# Patient Record
Sex: FEMALE | Race: WHITE | Employment: OTHER | URBAN - METROPOLITAN AREA
[De-identification: names, ages, dates, MRNs, and addresses within clinical notes are randomized per-mention and may not be internally consistent; named-entity substitution may affect disease eponyms.]

---

## 2017-04-12 RX ORDER — DILTIAZEM HYDROCHLORIDE 240 MG/1
240 CAPSULE, COATED, EXTENDED RELEASE ORAL DAILY
Qty: 30 CAPSULE | Refills: 5 | Status: SHIPPED | OUTPATIENT
Start: 2017-04-12 | End: 2017-09-13 | Stop reason: SDUPTHER

## 2017-06-26 ENCOUNTER — TELEPHONE (OUTPATIENT)
Dept: CARDIOLOGY CLINIC | Age: 82
End: 2017-06-26

## 2017-09-13 ENCOUNTER — OFFICE VISIT (OUTPATIENT)
Dept: CARDIOLOGY CLINIC | Age: 82
End: 2017-09-13

## 2017-09-13 VITALS
OXYGEN SATURATION: 96 % | DIASTOLIC BLOOD PRESSURE: 60 MMHG | HEART RATE: 74 BPM | SYSTOLIC BLOOD PRESSURE: 136 MMHG | BODY MASS INDEX: 33.13 KG/M2 | HEIGHT: 63 IN | WEIGHT: 187 LBS

## 2017-09-13 DIAGNOSIS — E78.2 MIXED HYPERLIPIDEMIA: ICD-10-CM

## 2017-09-13 DIAGNOSIS — I48.0 PAROXYSMAL ATRIAL FIBRILLATION (HCC): Primary | ICD-10-CM

## 2017-09-13 DIAGNOSIS — I10 ESSENTIAL HYPERTENSION: ICD-10-CM

## 2017-09-13 PROCEDURE — 99214 OFFICE O/P EST MOD 30 MIN: CPT | Performed by: INTERNAL MEDICINE

## 2017-09-13 RX ORDER — DILTIAZEM HYDROCHLORIDE 240 MG/1
240 CAPSULE, COATED, EXTENDED RELEASE ORAL DAILY
Qty: 30 CAPSULE | Refills: 5 | Status: SHIPPED | OUTPATIENT
Start: 2017-09-13 | End: 2018-04-10 | Stop reason: SDUPTHER

## 2017-09-13 RX ORDER — FUROSEMIDE 20 MG/1
20 TABLET ORAL DAILY PRN
Qty: 30 TABLET | Refills: 5 | Status: SHIPPED | OUTPATIENT
Start: 2017-09-13 | End: 2018-09-13 | Stop reason: SDUPTHER

## 2017-09-26 ENCOUNTER — TELEPHONE (OUTPATIENT)
Dept: CARDIOLOGY CLINIC | Age: 82
End: 2017-09-26

## 2017-10-02 ENCOUNTER — TELEPHONE (OUTPATIENT)
Dept: CARDIOLOGY CLINIC | Age: 82
End: 2017-10-02

## 2017-10-02 NOTE — TELEPHONE ENCOUNTER
----- Message from Jerry Smalls MD sent at 10/2/2017  8:11 AM EDT -----  Cholesterol labs are good overall except  and would like < 100. Please keep taking pravachol every day and watch diet as closely as possible.

## 2017-10-30 ENCOUNTER — TELEPHONE (OUTPATIENT)
Dept: CARDIOLOGY CLINIC | Age: 82
End: 2017-10-30

## 2018-04-10 RX ORDER — DILTIAZEM HYDROCHLORIDE 240 MG/1
240 CAPSULE, COATED, EXTENDED RELEASE ORAL DAILY
Qty: 30 CAPSULE | Refills: 5 | Status: SHIPPED | OUTPATIENT
Start: 2018-04-10 | End: 2018-09-13 | Stop reason: SDUPTHER

## 2018-09-13 ENCOUNTER — OFFICE VISIT (OUTPATIENT)
Dept: CARDIOLOGY CLINIC | Age: 83
End: 2018-09-13

## 2018-09-13 VITALS
DIASTOLIC BLOOD PRESSURE: 62 MMHG | HEART RATE: 65 BPM | HEIGHT: 63 IN | OXYGEN SATURATION: 95 % | SYSTOLIC BLOOD PRESSURE: 148 MMHG | BODY MASS INDEX: 32.07 KG/M2 | WEIGHT: 181 LBS

## 2018-09-13 DIAGNOSIS — I48.0 PAROXYSMAL ATRIAL FIBRILLATION (HCC): Primary | ICD-10-CM

## 2018-09-13 DIAGNOSIS — I10 ESSENTIAL HYPERTENSION: ICD-10-CM

## 2018-09-13 DIAGNOSIS — E78.2 MIXED HYPERLIPIDEMIA: ICD-10-CM

## 2018-09-13 PROCEDURE — 99214 OFFICE O/P EST MOD 30 MIN: CPT | Performed by: INTERNAL MEDICINE

## 2018-09-13 RX ORDER — FUROSEMIDE 20 MG/1
20 TABLET ORAL DAILY PRN
Qty: 30 TABLET | Refills: 11 | Status: SHIPPED | OUTPATIENT
Start: 2018-09-13 | End: 2019-09-24 | Stop reason: SDUPTHER

## 2018-09-13 RX ORDER — DILTIAZEM HYDROCHLORIDE 240 MG/1
240 CAPSULE, COATED, EXTENDED RELEASE ORAL DAILY
Qty: 30 CAPSULE | Refills: 11 | Status: SHIPPED | OUTPATIENT
Start: 2018-09-13 | End: 2019-09-10 | Stop reason: SDUPTHER

## 2018-09-13 NOTE — PROGRESS NOTES
[address]   Cardiac Followup    Referring Provider:  Edward Lorenzo MD     Chief Complaint   Patient presents with    1 Year Follow Up     no cardiac complaints      Subjective: Ms Rosalba Ramirez presents to [facility] for cardiology follow up of HTN, HLD, PAF;  No complaints today     History of Present Illness:     Ms. Rosalba Ramirez is an 79yo female with history of HTN, HLD, PAF, diet controlled  diabetes, and thyroid disease. Admitted on 4/21/15 with gallstone pancreatitis. She developed atrial fibrillation during her admission. She did not have any sensation of palpitations, presyncope, dizziness, CP, or SOB with atrial fibrillation. She converted to normal sinus rhythm on IV Cardizem. Note EKG 5/6/15 showed NSR, inferior wall infarct and nonspecific ST changes. Most recent ECHO 4/24/15 showed LVEF of 55%. She returned back to the ER on 2015 with recurrent acute pancreatitis. She underwent ERCP on 2015 with cholecystectomy on 2015. She had PAF during hospitalization and note that she did not feel symptoms with the arrhythmia. Today she is here for follow up. She states she has been feeling good and active in daily life. She has not been checking her blood pressure at home. She denies chest pain, shortness of breath, edema, dizziness, palpitations and syncope. Past Medical History:   has a past medical history of CKD (chronic kidney disease) stage 3, GFR 30-59 ml/min; Diabetes mellitus ([address] Hyperlipidemia; Hypertension; and Thyroid disease. Surgical History:   has a past surgical history that includes Dilation and curettage of uterus; Tubal ligation; Bladder surgery; Skin cancer excision;  section; Colonoscopy (2007); ERCP (2015); and Cholecystectomy (05/12/15). Social History:   reports that she has never smoked. She has never used smokeless tobacco. She reports that she drinks alcohol. She reports that she does not use drugs.      Family History:  family This will have your blood pressure from today on it   4. Continue using Lasix as needed for swelling - you can take it daily if need be  5. Check blood pressure at home 3-4 times a week, call the office if you are constantly over 140/90  6. Follow up with me in 1 year. Thank you for allowing me to participate in the care of this individual.    Mariajose Burns.  Minna Partida M.D., [facility] [place]

## 2018-09-13 NOTE — LETTER
blood pressure from today on it   4. Continue using Lasix as needed for swelling - you can take it daily if need be  5. Check blood pressure at home 3-4 times a week, call the office if you are constantly over 140/90  6. Follow up with me in 1 year. If you have questions, please do not hesitate to call me. I look forward to following Greg Hinkle along with you.     Sincerely,        Rafael Lennon MD

## 2019-03-19 ENCOUNTER — TELEPHONE (OUTPATIENT)
Dept: CARDIOLOGY CLINIC | Age: 84
End: 2019-03-19

## 2019-09-10 ENCOUNTER — OFFICE VISIT (OUTPATIENT)
Dept: CARDIOLOGY CLINIC | Age: 84
End: 2019-09-10
Payer: MEDICARE

## 2019-09-10 VITALS
OXYGEN SATURATION: 97 % | WEIGHT: 180 LBS | BODY MASS INDEX: 31.89 KG/M2 | SYSTOLIC BLOOD PRESSURE: 138 MMHG | DIASTOLIC BLOOD PRESSURE: 60 MMHG | HEART RATE: 65 BPM | HEIGHT: 63 IN

## 2019-09-10 DIAGNOSIS — I48.0 PAROXYSMAL ATRIAL FIBRILLATION (HCC): Primary | ICD-10-CM

## 2019-09-10 DIAGNOSIS — I10 ESSENTIAL HYPERTENSION: ICD-10-CM

## 2019-09-10 PROCEDURE — 99214 OFFICE O/P EST MOD 30 MIN: CPT | Performed by: INTERNAL MEDICINE

## 2019-09-10 RX ORDER — DILTIAZEM HYDROCHLORIDE 240 MG/1
240 CAPSULE, COATED, EXTENDED RELEASE ORAL DAILY
Qty: 90 CAPSULE | Refills: 3 | Status: SHIPPED | OUTPATIENT
Start: 2019-09-10 | End: 2020-09-28

## 2019-09-10 RX ORDER — IRBESARTAN 150 MG/1
150 TABLET ORAL NIGHTLY
Status: ON HOLD | COMMUNITY
End: 2019-12-26

## 2019-09-10 NOTE — PROGRESS NOTES
loss or vertigo. No mouth sores or sore throat. · Cardiovascular: Reviewed in HPI  · Respiratory: No cough or wheezing, no sputum production. No hematemesis. · Gastrointestinal: No abdominal pain, appetite loss, blood in stools. No change in bowel or bladder habits. · Genitourinary: No dysuria, trouble voiding, or hematuria. · Musculoskeletal:  No gait disturbance, weakness or joint complaints. · Integumentary: No rash or pruritis. · Neurological: No headache, diplopia, change in muscle strength, numbness or tingling. No change in gait, balance, coordination, mood, affect, memory, mentation, behavior. · Psychiatric: No anxiety, no depression. · Endocrine: No malaise, fatigue or temperature intolerance. No excessive thirst, fluid intake, or urination. No tremor. · Hematologic/Lymphatic: No abnormal bruising or bleeding, blood clots or swollen lymph nodes. · Allergic/Immunologic: No nasal congestion or hives. Physical Examination:    Vitals:    09/10/19 1332   BP: 138/60   Pulse: 65   SpO2: 97%        Constitutional and General Appearance: NAD   Respiratory:  · Normal excursion and expansion without use of accessory muscles  · Resp Auscultation: Normal breath sounds without dullness  Cardiovascular:  · The apical impulses not displaced  · Heart tones are crisp and normal  · Cervical veins are not engorged  · The carotid upstroke is normal in amplitude and contour without delay or bruit  · Normal S1S2, No S3, +soft AMA  · Peripheral pulses are symmetrical and full  · There is no clubbing, cyanosis of the extremities.   · Trace BLE edema and erythematous chronic skin discoloration  · Femoral Arteries: 2+ and equal  · Pedal Pulses: 2+ and equal   Abdomen:  · No masses or tenderness  · Liver/Spleen: No Abnormalities Noted  Neurological/Psychiatric:  · Alert and oriented in all spheres  · Moves all extremities well  · Exhibits normal gait balance and coordination  · No abnormalities of mood, affect, memory,

## 2019-09-24 RX ORDER — FUROSEMIDE 20 MG/1
20 TABLET ORAL DAILY PRN
Qty: 30 TABLET | Refills: 11 | Status: ON HOLD | OUTPATIENT
Start: 2019-09-24 | End: 2019-12-31 | Stop reason: HOSPADM

## 2019-12-10 ENCOUNTER — TELEPHONE (OUTPATIENT)
Dept: CARDIOLOGY CLINIC | Age: 84
End: 2019-12-10

## 2019-12-26 ENCOUNTER — HOSPITAL ENCOUNTER (INPATIENT)
Age: 84
LOS: 5 days | Discharge: NURSING, REHAB OR LONG-TERM CARE | DRG: 291 | End: 2019-12-31
Attending: EMERGENCY MEDICINE | Admitting: INTERNAL MEDICINE
Payer: MEDICARE

## 2019-12-26 ENCOUNTER — APPOINTMENT (OUTPATIENT)
Dept: GENERAL RADIOLOGY | Age: 84
DRG: 291 | End: 2019-12-26
Payer: MEDICARE

## 2019-12-26 PROBLEM — R06.00 DYSPNEA: Status: ACTIVE | Noted: 2019-12-26

## 2019-12-26 PROBLEM — I50.9 CHF (CONGESTIVE HEART FAILURE) (HCC): Status: ACTIVE | Noted: 2019-12-26

## 2019-12-26 LAB
A/G RATIO: 1.2 (ref 1.1–2.2)
ALBUMIN SERPL-MCNC: 3.8 G/DL (ref 3.4–5)
ALP BLD-CCNC: 56 U/L (ref 40–129)
ALT SERPL-CCNC: 15 U/L (ref 10–40)
ANION GAP SERPL CALCULATED.3IONS-SCNC: 13 MMOL/L (ref 3–16)
AST SERPL-CCNC: 21 U/L (ref 15–37)
BASOPHILS ABSOLUTE: 0.1 K/UL (ref 0–0.2)
BASOPHILS RELATIVE PERCENT: 1.3 %
BILIRUB SERPL-MCNC: 0.7 MG/DL (ref 0–1)
BUN BLDV-MCNC: 14 MG/DL (ref 7–20)
CALCIUM SERPL-MCNC: 9.1 MG/DL (ref 8.3–10.6)
CHLORIDE BLD-SCNC: 99 MMOL/L (ref 99–110)
CO2: 27 MMOL/L (ref 21–32)
CREAT SERPL-MCNC: 0.9 MG/DL (ref 0.6–1.2)
EKG ATRIAL RATE: 63 BPM
EKG DIAGNOSIS: NORMAL
EKG P AXIS: 52 DEGREES
EKG P-R INTERVAL: 218 MS
EKG Q-T INTERVAL: 552 MS
EKG QRS DURATION: 96 MS
EKG QTC CALCULATION (BAZETT): 564 MS
EKG R AXIS: 86 DEGREES
EKG T AXIS: 107 DEGREES
EKG VENTRICULAR RATE: 63 BPM
EOSINOPHILS ABSOLUTE: 0.1 K/UL (ref 0–0.6)
EOSINOPHILS RELATIVE PERCENT: 1.6 %
GFR AFRICAN AMERICAN: >60
GFR NON-AFRICAN AMERICAN: 59
GLOBULIN: 3.1 G/DL
GLUCOSE BLD-MCNC: 165 MG/DL (ref 70–99)
HCT VFR BLD CALC: 37.8 % (ref 36–48)
HEMOGLOBIN: 12.5 G/DL (ref 12–16)
INR BLD: 3.69 (ref 0.86–1.14)
LYMPHOCYTES ABSOLUTE: 1.4 K/UL (ref 1–5.1)
LYMPHOCYTES RELATIVE PERCENT: 23.5 %
MAGNESIUM: 1.8 MG/DL (ref 1.8–2.4)
MCH RBC QN AUTO: 26.5 PG (ref 26–34)
MCHC RBC AUTO-ENTMCNC: 33.1 G/DL (ref 31–36)
MCV RBC AUTO: 79.9 FL (ref 80–100)
MONOCYTES ABSOLUTE: 0.4 K/UL (ref 0–1.3)
MONOCYTES RELATIVE PERCENT: 7.1 %
NEUTROPHILS ABSOLUTE: 4.1 K/UL (ref 1.7–7.7)
NEUTROPHILS RELATIVE PERCENT: 66.5 %
PDW BLD-RTO: 15.9 % (ref 12.4–15.4)
PLATELET # BLD: 178 K/UL (ref 135–450)
PMV BLD AUTO: 8.2 FL (ref 5–10.5)
POTASSIUM REFLEX MAGNESIUM: 3.1 MMOL/L (ref 3.5–5.1)
PRO-BNP: 1293 PG/ML (ref 0–449)
PROCALCITONIN: 0.03 NG/ML (ref 0–0.15)
PROTHROMBIN TIME: 43.4 SEC (ref 10–13.2)
RBC # BLD: 4.73 M/UL (ref 4–5.2)
SODIUM BLD-SCNC: 139 MMOL/L (ref 136–145)
TOTAL PROTEIN: 6.9 G/DL (ref 6.4–8.2)
TROPONIN: 0.03 NG/ML
WBC # BLD: 6.1 K/UL (ref 4–11)

## 2019-12-26 PROCEDURE — 99285 EMERGENCY DEPT VISIT HI MDM: CPT

## 2019-12-26 PROCEDURE — 85610 PROTHROMBIN TIME: CPT

## 2019-12-26 PROCEDURE — 6360000002 HC RX W HCPCS: Performed by: INTERNAL MEDICINE

## 2019-12-26 PROCEDURE — 6360000002 HC RX W HCPCS: Performed by: NURSE PRACTITIONER

## 2019-12-26 PROCEDURE — 6370000000 HC RX 637 (ALT 250 FOR IP): Performed by: INTERNAL MEDICINE

## 2019-12-26 PROCEDURE — 83735 ASSAY OF MAGNESIUM: CPT

## 2019-12-26 PROCEDURE — 1200000000 HC SEMI PRIVATE

## 2019-12-26 PROCEDURE — 80053 COMPREHEN METABOLIC PANEL: CPT

## 2019-12-26 PROCEDURE — 84145 PROCALCITONIN (PCT): CPT

## 2019-12-26 PROCEDURE — 85025 COMPLETE CBC W/AUTO DIFF WBC: CPT

## 2019-12-26 PROCEDURE — 2580000003 HC RX 258: Performed by: INTERNAL MEDICINE

## 2019-12-26 PROCEDURE — 93005 ELECTROCARDIOGRAM TRACING: CPT | Performed by: NURSE PRACTITIONER

## 2019-12-26 PROCEDURE — 84484 ASSAY OF TROPONIN QUANT: CPT

## 2019-12-26 PROCEDURE — 71046 X-RAY EXAM CHEST 2 VIEWS: CPT

## 2019-12-26 PROCEDURE — 83880 ASSAY OF NATRIURETIC PEPTIDE: CPT

## 2019-12-26 PROCEDURE — 6370000000 HC RX 637 (ALT 250 FOR IP): Performed by: NURSE PRACTITIONER

## 2019-12-26 PROCEDURE — 96374 THER/PROPH/DIAG INJ IV PUSH: CPT

## 2019-12-26 RX ORDER — DILTIAZEM HYDROCHLORIDE 240 MG/1
240 CAPSULE, COATED, EXTENDED RELEASE ORAL DAILY
Status: DISCONTINUED | OUTPATIENT
Start: 2019-12-26 | End: 2019-12-31 | Stop reason: HOSPADM

## 2019-12-26 RX ORDER — PRAVASTATIN SODIUM 20 MG
20 TABLET ORAL DAILY
Status: DISCONTINUED | OUTPATIENT
Start: 2019-12-26 | End: 2019-12-27 | Stop reason: SDUPTHER

## 2019-12-26 RX ORDER — POTASSIUM CHLORIDE 20 MEQ/1
40 TABLET, EXTENDED RELEASE ORAL PRN
Status: DISCONTINUED | OUTPATIENT
Start: 2019-12-26 | End: 2019-12-27

## 2019-12-26 RX ORDER — METOPROLOL SUCCINATE 50 MG/1
200 TABLET, EXTENDED RELEASE ORAL DAILY
Status: DISCONTINUED | OUTPATIENT
Start: 2019-12-26 | End: 2019-12-29

## 2019-12-26 RX ORDER — MAGNESIUM SULFATE 1 G/100ML
1 INJECTION INTRAVENOUS PRN
Status: DISCONTINUED | OUTPATIENT
Start: 2019-12-26 | End: 2019-12-27

## 2019-12-26 RX ORDER — SODIUM CHLORIDE 0.9 % (FLUSH) 0.9 %
10 SYRINGE (ML) INJECTION EVERY 12 HOURS SCHEDULED
Status: DISCONTINUED | OUTPATIENT
Start: 2019-12-26 | End: 2019-12-31 | Stop reason: HOSPADM

## 2019-12-26 RX ORDER — PROCHLORPERAZINE EDISYLATE 5 MG/ML
10 INJECTION INTRAMUSCULAR; INTRAVENOUS EVERY 6 HOURS PRN
Status: DISCONTINUED | OUTPATIENT
Start: 2019-12-26 | End: 2019-12-31 | Stop reason: HOSPADM

## 2019-12-26 RX ORDER — SODIUM CHLORIDE 0.9 % (FLUSH) 0.9 %
10 SYRINGE (ML) INJECTION PRN
Status: DISCONTINUED | OUTPATIENT
Start: 2019-12-26 | End: 2019-12-31 | Stop reason: HOSPADM

## 2019-12-26 RX ORDER — POTASSIUM CHLORIDE 20 MEQ/1
40 TABLET, EXTENDED RELEASE ORAL ONCE
Status: COMPLETED | OUTPATIENT
Start: 2019-12-26 | End: 2019-12-26

## 2019-12-26 RX ORDER — FUROSEMIDE 10 MG/ML
40 INJECTION INTRAMUSCULAR; INTRAVENOUS DAILY
Status: DISCONTINUED | OUTPATIENT
Start: 2019-12-27 | End: 2019-12-26

## 2019-12-26 RX ORDER — ACETAMINOPHEN 325 MG/1
650 TABLET ORAL EVERY 4 HOURS PRN
Status: DISCONTINUED | OUTPATIENT
Start: 2019-12-26 | End: 2019-12-31 | Stop reason: HOSPADM

## 2019-12-26 RX ORDER — FUROSEMIDE 10 MG/ML
40 INJECTION INTRAMUSCULAR; INTRAVENOUS 2 TIMES DAILY
Status: DISCONTINUED | OUTPATIENT
Start: 2019-12-26 | End: 2019-12-30

## 2019-12-26 RX ORDER — FUROSEMIDE 10 MG/ML
20 INJECTION INTRAMUSCULAR; INTRAVENOUS ONCE
Status: COMPLETED | OUTPATIENT
Start: 2019-12-26 | End: 2019-12-26

## 2019-12-26 RX ORDER — LEVOTHYROXINE SODIUM 88 UG/1
88 TABLET ORAL DAILY
Status: DISCONTINUED | OUTPATIENT
Start: 2019-12-27 | End: 2019-12-31 | Stop reason: HOSPADM

## 2019-12-26 RX ORDER — FAMOTIDINE 20 MG/1
20 TABLET, FILM COATED ORAL DAILY
Status: DISCONTINUED | OUTPATIENT
Start: 2019-12-26 | End: 2019-12-31 | Stop reason: HOSPADM

## 2019-12-26 RX ORDER — LOSARTAN POTASSIUM 100 MG/1
100 TABLET ORAL DAILY
Status: DISCONTINUED | OUTPATIENT
Start: 2019-12-26 | End: 2019-12-31 | Stop reason: HOSPADM

## 2019-12-26 RX ORDER — POTASSIUM CHLORIDE 7.45 MG/ML
10 INJECTION INTRAVENOUS PRN
Status: DISCONTINUED | OUTPATIENT
Start: 2019-12-26 | End: 2019-12-27

## 2019-12-26 RX ADMIN — FUROSEMIDE 20 MG: 10 INJECTION, SOLUTION INTRAMUSCULAR; INTRAVENOUS at 16:36

## 2019-12-26 RX ADMIN — FAMOTIDINE 20 MG: 20 TABLET, FILM COATED ORAL at 22:49

## 2019-12-26 RX ADMIN — POTASSIUM CHLORIDE 40 MEQ: 20 TABLET, EXTENDED RELEASE ORAL at 16:37

## 2019-12-26 RX ADMIN — Medication 10 ML: at 22:50

## 2019-12-26 RX ADMIN — FUROSEMIDE 40 MG: 10 INJECTION, SOLUTION INTRAMUSCULAR; INTRAVENOUS at 22:49

## 2019-12-26 ASSESSMENT — PAIN SCALES - GENERAL: PAINLEVEL_OUTOF10: 0

## 2019-12-26 NOTE — ED PROVIDER NOTES
comfortably in full sentences. Bilateral lung sounds clear to auscultation without wheezing, rhonchi or rales. ABDOMEN: Soft, non-distended and non-tender. No hernias or masses appreciated. No organomegaly. No rebound or guarding. Bowel sounds audible and active in all four quadrants. EXTREMITIES: Moves all extremities equally and neurovascularly intact. Noted with 1-2+ pitting edema to the bilateral upper extremities, 2-3+ pitting edema to the lower extremities and along the anterior shin. SKIN: Pink ,warm and dry. No acute rashes. NEUROLOGICAL: Alert and oriented x 4. Speech clear. No gross facial drooping. Strength is 5/5 in all extremities and sensation intact. PSYCHIATRIC: Normal mood and affect. RADIOLOGY  Xr Chest Standard (2 Vw)    Result Date: 12/26/2019  EXAMINATION: TWO XRAY VIEWS OF THE CHEST 12/26/2019 2:58 pm COMPARISON: 04/20/2015 HISTORY: ORDERING SYSTEM PROVIDED HISTORY: cough TECHNOLOGIST PROVIDED HISTORY: Reason for exam:->cough Reason for Exam: sob for 1 week Acuity: Acute Type of Exam: Initial FINDINGS: Heart size and pulmonary vessels within normal limits. Thoracic aorta tortuous. No definite infiltrate. Small bilateral pleural effusions, mostly subpulmonic     Small bilateral subpulmonic pleural effusions. No definite infiltrate     ED COURSE  I have evaluated this patient in collaboration with Dr. Park Oliver. Patient seen and evaluated. Old records reviewed. Patient presenting for evaluation of shortness of breath. Afebrile. Vital stable. Not tachycardic or tachypneic. Not hypoxic. Overall, well-appearing. Patient has been with a nonproductive cough over the last several weeks, recently completed course of doxycycline as well as been taking Mucinex without improvement.   Patient also with history of congestive heart failure, takes Lasix as needed; however, has not taken any over the last several days related to the holidays and noticed increased swelling this Comprehensive Metabolic Panel w/ Reflex to MG   Result Value Ref Range    Sodium 139 136 - 145 mmol/L    Potassium reflex Magnesium 3.1 (L) 3.5 - 5.1 mmol/L    Chloride 99 99 - 110 mmol/L    CO2 27 21 - 32 mmol/L    Anion Gap 13 3 - 16    Glucose 165 (H) 70 - 99 mg/dL    BUN 14 7 - 20 mg/dL    CREATININE 0.9 0.6 - 1.2 mg/dL    GFR Non- 59 (A) >60    GFR African American >60 >60    Calcium 9.1 8.3 - 10.6 mg/dL    Total Protein 6.9 6.4 - 8.2 g/dL    Alb 3.8 3.4 - 5.0 g/dL    Albumin/Globulin Ratio 1.2 1.1 - 2.2    Total Bilirubin 0.7 0.0 - 1.0 mg/dL    Alkaline Phosphatase 56 40 - 129 U/L    ALT 15 10 - 40 U/L    AST 21 15 - 37 U/L    Globulin 3.1 g/dL   Troponin   Result Value Ref Range    Troponin 0.03 (H) <0.01 ng/mL   Protime-INR   Result Value Ref Range    Protime 43.4 (H) 10.0 - 13.2 sec    INR 3.69 (H) 0.86 - 1.14   Brain Natriuretic Peptide   Result Value Ref Range    Pro-BNP 1,293 (H) 0 - 449 pg/mL   Magnesium   Result Value Ref Range    Magnesium 1.80 1.80 - 2.40 mg/dL   EKG 12 Lead   Result Value Ref Range    Ventricular Rate 63 BPM    Atrial Rate 63 BPM    P-R Interval 218 ms    QRS Duration 96 ms    Q-T Interval 552 ms    QTc Calculation (Bazett) 564 ms    P Axis 52 degrees    R Axis 86 degrees    T Axis 107 degrees    Diagnosis       Sinus rhythm with 1st degree A-V blockAnterior infarct (cited on or before 20-APR-2015)T wave abnormality, consider lateral ischemiaProlonged QTAbnormal ECGWhen compared with ECG of 06-MAY-2015 03:29,Minimal criteria for Inferior infarct are no longer PresentInverted T waves have replaced nonspecific T wave abnormality in Lateral leadsQT has lengthened     Final Impression    1. Elevated troponin    2. Shortness of breath        Blood pressure (!) 165/73, pulse 64, temperature 98.3 °F (36.8 °C), temperature source Oral, resp. rate 21, height 5' 3\" (1.6 m), weight 175 lb (79.4 kg), SpO2 97 %.       DISPOSITION  Patient was admitted to the hospital. DISCLAIMER:  Please note this report has been produced using speech recognition Dragon software and may contain errors related to that system including errors in grammar, punctuation, and spelling, as well as words and phrases that may be inappropriate. If there are any questions or concerns please feel free to contact the dictating provider for clarification.                 VIKTORIYA Lieberman CNP  12/26/19 8894

## 2019-12-27 LAB
ANION GAP SERPL CALCULATED.3IONS-SCNC: 14 MMOL/L (ref 3–16)
BUN BLDV-MCNC: 12 MG/DL (ref 7–20)
CALCIUM SERPL-MCNC: 9.1 MG/DL (ref 8.3–10.6)
CHLORIDE BLD-SCNC: 97 MMOL/L (ref 99–110)
CO2: 30 MMOL/L (ref 21–32)
CREAT SERPL-MCNC: 0.9 MG/DL (ref 0.6–1.2)
GFR AFRICAN AMERICAN: >60
GFR NON-AFRICAN AMERICAN: 59
GLUCOSE BLD-MCNC: 142 MG/DL (ref 70–99)
HCT VFR BLD CALC: 36 % (ref 36–48)
HEMOGLOBIN: 12.1 G/DL (ref 12–16)
MAGNESIUM: 1.7 MG/DL (ref 1.8–2.4)
MCH RBC QN AUTO: 26.6 PG (ref 26–34)
MCHC RBC AUTO-ENTMCNC: 33.5 G/DL (ref 31–36)
MCV RBC AUTO: 79.4 FL (ref 80–100)
PDW BLD-RTO: 16 % (ref 12.4–15.4)
PLATELET # BLD: 162 K/UL (ref 135–450)
PMV BLD AUTO: 7.9 FL (ref 5–10.5)
POTASSIUM SERPL-SCNC: 2.9 MMOL/L (ref 3.5–5.1)
RBC # BLD: 4.53 M/UL (ref 4–5.2)
SODIUM BLD-SCNC: 141 MMOL/L (ref 136–145)
TROPONIN: <0.01 NG/ML
WBC # BLD: 4.8 K/UL (ref 4–11)

## 2019-12-27 PROCEDURE — 99223 1ST HOSP IP/OBS HIGH 75: CPT | Performed by: INTERNAL MEDICINE

## 2019-12-27 PROCEDURE — 83735 ASSAY OF MAGNESIUM: CPT

## 2019-12-27 PROCEDURE — 84484 ASSAY OF TROPONIN QUANT: CPT

## 2019-12-27 PROCEDURE — 2580000003 HC RX 258: Performed by: INTERNAL MEDICINE

## 2019-12-27 PROCEDURE — 6370000000 HC RX 637 (ALT 250 FOR IP): Performed by: INTERNAL MEDICINE

## 2019-12-27 PROCEDURE — 6360000002 HC RX W HCPCS: Performed by: INTERNAL MEDICINE

## 2019-12-27 PROCEDURE — 80048 BASIC METABOLIC PNL TOTAL CA: CPT

## 2019-12-27 PROCEDURE — 85027 COMPLETE CBC AUTOMATED: CPT

## 2019-12-27 PROCEDURE — 36415 COLL VENOUS BLD VENIPUNCTURE: CPT

## 2019-12-27 PROCEDURE — 1200000000 HC SEMI PRIVATE

## 2019-12-27 RX ORDER — CLONIDINE HYDROCHLORIDE 0.1 MG/1
0.2 TABLET ORAL 2 TIMES DAILY
Status: DISCONTINUED | OUTPATIENT
Start: 2019-12-27 | End: 2019-12-31 | Stop reason: HOSPADM

## 2019-12-27 RX ORDER — ASPIRIN 81 MG/1
81 TABLET ORAL ONCE
Status: DISCONTINUED | OUTPATIENT
Start: 2019-12-27 | End: 2019-12-31 | Stop reason: HOSPADM

## 2019-12-27 RX ORDER — POTASSIUM CHLORIDE 20 MEQ/1
20 TABLET, EXTENDED RELEASE ORAL 2 TIMES DAILY
Status: DISCONTINUED | OUTPATIENT
Start: 2019-12-27 | End: 2019-12-31 | Stop reason: HOSPADM

## 2019-12-27 RX ORDER — MAGNESIUM SULFATE IN WATER 40 MG/ML
2 INJECTION, SOLUTION INTRAVENOUS ONCE
Status: COMPLETED | OUTPATIENT
Start: 2019-12-27 | End: 2019-12-27

## 2019-12-27 RX ORDER — ATORVASTATIN CALCIUM 40 MG/1
40 TABLET, FILM COATED ORAL NIGHTLY
Status: DISCONTINUED | OUTPATIENT
Start: 2019-12-27 | End: 2019-12-31 | Stop reason: HOSPADM

## 2019-12-27 RX ORDER — CHOLECALCIFEROL (VITAMIN D3) 125 MCG
5 CAPSULE ORAL NIGHTLY PRN
Status: DISCONTINUED | OUTPATIENT
Start: 2019-12-27 | End: 2019-12-31 | Stop reason: HOSPADM

## 2019-12-27 RX ADMIN — FUROSEMIDE 40 MG: 10 INJECTION, SOLUTION INTRAMUSCULAR; INTRAVENOUS at 09:49

## 2019-12-27 RX ADMIN — MAGNESIUM SULFATE HEPTAHYDRATE 2 G: 40 INJECTION, SOLUTION INTRAVENOUS at 11:51

## 2019-12-27 RX ADMIN — ATORVASTATIN CALCIUM 40 MG: 40 TABLET, FILM COATED ORAL at 15:20

## 2019-12-27 RX ADMIN — LOSARTAN POTASSIUM 100 MG: 100 TABLET, FILM COATED ORAL at 09:49

## 2019-12-27 RX ADMIN — POTASSIUM CHLORIDE 20 MEQ: 20 TABLET, EXTENDED RELEASE ORAL at 21:56

## 2019-12-27 RX ADMIN — FUROSEMIDE 40 MG: 10 INJECTION, SOLUTION INTRAMUSCULAR; INTRAVENOUS at 17:57

## 2019-12-27 RX ADMIN — CLONIDINE HYDROCHLORIDE 0.2 MG: 0.1 TABLET ORAL at 21:56

## 2019-12-27 RX ADMIN — POTASSIUM CHLORIDE 20 MEQ: 20 TABLET, EXTENDED RELEASE ORAL at 11:49

## 2019-12-27 RX ADMIN — CLONIDINE HYDROCHLORIDE 0.2 MG: 0.1 TABLET ORAL at 15:21

## 2019-12-27 RX ADMIN — DILTIAZEM HYDROCHLORIDE 240 MG: 240 CAPSULE, EXTENDED RELEASE ORAL at 09:50

## 2019-12-27 RX ADMIN — FAMOTIDINE 20 MG: 20 TABLET, FILM COATED ORAL at 09:50

## 2019-12-27 RX ADMIN — LEVOTHYROXINE SODIUM 88 MCG: 0.09 TABLET ORAL at 06:21

## 2019-12-27 RX ADMIN — Medication 10 ML: at 09:51

## 2019-12-27 RX ADMIN — RIVAROXABAN 15 MG: 15 TABLET, FILM COATED ORAL at 09:50

## 2019-12-27 RX ADMIN — PRAVASTATIN SODIUM 20 MG: 20 TABLET ORAL at 09:50

## 2019-12-27 RX ADMIN — Medication 10 ML: at 21:57

## 2019-12-27 RX ADMIN — MELATONIN TAB 5 MG 5 MG: 5 TAB at 21:56

## 2019-12-27 RX ADMIN — METOPROLOL SUCCINATE 200 MG: 50 TABLET, EXTENDED RELEASE ORAL at 09:50

## 2019-12-27 NOTE — FLOWSHEET NOTE
Cross cover page: 3 of the pts medications did not get order at admission and the pt is requesting them. She is requesting Fenofibrate 54mg, felodipine 5mg and vitamin D3. Please advise.

## 2019-12-27 NOTE — PROGRESS NOTES
Hospitalist Progress Note      PCP: Chelsey Jacob MD    Date of Admission: 12/26/2019    Chief Complaint: Shortness of breath, swollen lower and upper extremity    Hospital Course: See H&P    Subjective:   Patient is up in bed, comfortable, not in distress. Denies any chest pain or shortness of breath. No new event overnight noted. Medications:  Reviewed    Infusion Medications   Scheduled Medications    potassium chloride  20 mEq Oral BID    magnesium sulfate  2 g Intravenous Once    diltiazem  240 mg Oral Daily    famotidine  20 mg Oral Daily    levothyroxine  88 mcg Oral Daily    losartan  100 mg Oral Daily    metoprolol succinate  200 mg Oral Daily    pravastatin  20 mg Oral Daily    rivaroxaban  15 mg Oral Daily with breakfast    sodium chloride flush  10 mL Intravenous 2 times per day    furosemide  40 mg Intravenous BID     PRN Meds: sodium chloride flush, magnesium hydroxide, acetaminophen, prochlorperazine, perflutren lipid microspheres      Intake/Output Summary (Last 24 hours) at 12/27/2019 1240  Last data filed at 12/27/2019 1024  Gross per 24 hour   Intake 420 ml   Output 1900 ml   Net -1480 ml       Physical Exam Performed:    BP (!) 184/72   Pulse 73   Temp 97.9 °F (36.6 °C) (Oral)   Resp 16   Ht 5' 3\" (1.6 m)   Wt 187 lb 1.6 oz (84.9 kg)   SpO2 92%   BMI 33.14 kg/m²     General appearance: No apparent distress, appears stated age and cooperative. HEENT: Pupils equal, round, and reactive to light. Conjunctivae/corneas clear. Neck: Supple, with full range of motion. No jugular venous distention. Trachea midline. Respiratory:  Normal respiratory effort. Clear to auscultation, bilaterally without Rales/Wheezes/Rhonchi. Cardiovascular: Regular rate and rhythm with normal S1/S2 without murmurs, rubs or gallops. Abdomen: Soft, non-tender, non-distended with normal bowel sounds. Musculoskeletal: No clubbing, cyanosis , 3+edema bilaterally.   Full range of motion

## 2019-12-27 NOTE — H&P
Hospital Medicine History & Physical      PCP: Aravind Collado MD    Date of Admission: 2019    Date of Service: Pt seen/examined on 19 and Admitted to Inpatient with expected LOS greater than two midnights due to medical therapy. Chief Complaint:  dyspnea    History Of Present Illness: The patient is an 80 Y F with a h/o HTN, HLD, DM2, and Afib. She presented to her PCP with a cough and dyspnea recently, completed a course of doxycycline and guaifenesin. The cough resolved but the dyspnea has persisted. The patient is an unreliable historian but her family has noticed that she has become very edematous, and she seems to struggle to breath when lying flat. She started sleeping in a recliner a couple days ago. The patient says that she usually takes her PRN furosemide about 3x/week, but lately she has hardly been taking it because she hates having to urinate often. CXR showed pleural effusions. Her weight was 183 lbs three months ago at her cardiology clinic appointment, on admission here she weighed 193 lbs. Troponin was slightly elevated, patient denied chest pain. She has no listed h/o CHF, and EF was normal 4 years ago. Past Medical History:          Diagnosis Date    CKD (chronic kidney disease) stage 3, GFR 30-59 ml/min ([facility])     Diabetes mellitus ([facility] [address]     without complications    Hyperlipidemia     Hypertension     Thyroid disease        Past Surgical History:          Procedure Laterality Date    BLADDER SURGERY       SECTION      CHOLECYSTECTOMY  05/12/15    LAPAROSCOPIC CHOLECYSTECTOMY              COLONOSCOPY  2007    Polyps    DILATION AND CURETTAGE OF UTERUS      ERCP  2015    SKIN CANCER EXCISION      TUBAL LIGATION         Medications Prior to Admission:      Prior to Admission medications    Medication Sig Start Date End Date Taking? Authorizing Provider   fenofibrate (TRICOR) 54 MG tablet Take 54 mg by mouth daily.  [facility]: Please dispense generic fenofibrate unless prescriber denote   Yes Historical Provider, MD   furosemide (LASIX) 20 MG tablet Take 1 tablet by mouth daily as needed (swelling) 9/24/19   Jax Martinez MD   diltiazem (CARDIZEM CD) 240 MG extended release capsule Take 1 capsule by mouth daily 9/10/19 9/6/20  Jax Martinez MD   rivaroxaban Randene Forester) 15 MG TABS tablet Take 1 tablet by mouth daily (with breakfast) 9/10/19   Jax Martinez MD   felodipine (PLENDIL) 5 MG tablet Take 5 mg by mouth 2 times daily    Historical Provider, MD   metoprolol (TOPROL-XL) 200 MG XL tablet Take 200 mg by mouth daily. Historical Provider, MD   pravastatin (PRAVACHOL) 20 MG tablet Take 20 mg by mouth daily. Historical Provider, MD   levothyroxine (SYNTHROID) 75 MCG tablet Take 88 mcg by mouth Daily     Historical Provider, MD   VITAMIN D-3 (COLECALCIFEROL) 400 UNITS TABS Take by mouth daily. Historical Provider, MD   Docusate Calcium (STOOL SOFTENER PO) Take 1 tablet by mouth daily. Historical Provider, MD   clindamycin (CLINDAGEL) 1 % gel Apply topically daily. Apply topically 2 times daily. Historical Provider, MD   famotidine (PEPCID) 20 MG tablet Take 20 mg by mouth daily. Historical Provider, MD       Allergies:  Aspirin    Social History:      The patient currently lives at home    TOBACCO:   reports that she has never smoked. She has never used smokeless tobacco.  ETOH:   reports current alcohol use. E-Cigarettes Vaping or Juuling     Questions Responses    E-Cigarette Use     Start Date     Does device contain nicotine? Quit Date     E-Cigarette Type             Family History:      Reviewed in detail and negative for ESRD. Positive as follows:        Problem Relation Age of Onset   Desai Cancer Brother         lung    Cancer Sister         female       REVIEW OF SYSTEMS:   Pertinent positives as noted in the HPI. All other systems reviewed and negative.     PHYSICAL EXAM PERFORMED:    BP (!) 188/68   Pulse 69   Temp 98.4 °F (36.9 °C) (Oral)   Resp 18   Ht 5' 3\" (1.6 m)   Wt 193 lb 1.6 oz (87.6 kg)   SpO2 95%   BMI 34.21 kg/m²     General appearance:  No apparent distress, appears stated age and cooperative. HEENT:  Normal cephalic, atraumatic without obvious deformity. Pupils equal, round, and reactive to light. Extra ocular muscles intact. Conjunctivae/corneas clear. Neck: Supple, with full range of motion. No jugular venous distention. Trachea midline. Respiratory:  Normal respiratory effort. Bilaterally without Wheezes/Rhonchi. Bibasilar rales. Cardiovascular:  Regular rate and rhythm with normal S1/S2 without murmurs, rubs or gallops. Abdomen: Soft, non-tender, non-distended with normal bowel sounds. Musculoskeletal:  No clubbing, cyanosis. Pitting anasarca. Full range of motion without deformity. Skin: Skin color, texture, turgor normal.  No rashes or lesions. Neurologic:  Neurovascularly intact without any focal sensory/motor deficits.  Cranial nerves: II-XII intact, grossly non-focal.  Psychiatric:  Alert and oriented, thought content appropriate, normal insight  Capillary Refill: Brisk,< 3 seconds   Peripheral Pulses: +2 palpable, equal bilaterally       Labs:     Recent Labs     12/26/19  1530   WBC 6.1   HGB 12.5   HCT 37.8        Recent Labs     12/26/19  1530      K 3.1*   CL 99   CO2 27   BUN 14   CREATININE 0.9   CALCIUM 9.1     Recent Labs     12/26/19  1530   AST 21   ALT 15   BILITOT 0.7   ALKPHOS 56     Recent Labs     12/26/19  1530   INR 3.69*     Recent Labs     12/26/19  1530   TROPONINI 0.03*       Urinalysis:      Lab Results   Component Value Date    NITRU NEGATIVE 09/26/2017    WBCUA 0-2 10/08/2014    BACTERIA Rare 10/08/2014    RBCUA NEGATIVE 09/26/2017    BLOODU TRACE-INTACT 05/06/2015    SPECGRAV 1.014 09/26/2017    GLUCOSEU NEGATIVE 09/26/2017    GLUCOSEU NEGATIVE 01/10/2012       Radiology:     CXR: I have reviewed the CXR with the anticoagulation as above  Diet: DIET CARDIAC;  Code Status: Full Code    PT/OT Eval Status: not indicated    Dispo - when adequately diuresed, perhaps 12/28-29. She is eager to discharge home. Chris Robertson MD    Thank you Sid Barcenas MD for the opportunity to be involved in this patient's care. If you have any questions or concerns please feel free to contact me at [phone].

## 2019-12-27 NOTE — PROGRESS NOTES
Notified on call physician via page about: Patient is requesting something for sleep. Can you order melatonin? Thanks.

## 2019-12-27 NOTE — PROGRESS NOTES
Nutrition Education    Type and Reason for Visit: Patient Education    Nutrition Assessment:      Pt seen per [facility] for CHF diet education. Provided pt with written and verbal instruction on HF nutrition therapy. Discussed low sodium diet, daily weights, and fluid restriction. Pt voiced understanding. Pt reports that she does not monitor or restrict her salt intake, fluid intake or weight at home. Pt states that she goes out to eat frequently as well Corliss Angelucci[address], [place]). Encouraged compliance and discussed healthier options. Time spent: 10 minutes    · Verbally reviewed information with Patient  · Written educational materials provided. · Contact name and number provided. · Refer to Patient Education activity for more details.     Electronically signed by Marcus Finn RD, LD on 12/27/19 at 12:00 PM    Contact Number: Office: [phone]; [address]: [phone]

## 2019-12-27 NOTE — CARE COORDINATION
CASE MANAGEMENT INITIAL ASSESSMENT      Reviewed chart and met with patient today, re: 80 y.o. female  Explained Case Management role/services. Family present: multiple  Primary contact information:Karen Choudhury    Admit date/status: 12/26/19  Diagnosis: dyspnea  Is this a Readmission?:  n      Insurance: [id number] required for SNF - y        3 night stay required - N    Living arrangements, Adls, care needs, prior to admission: lives in ranch style home alone 1 step entry    Transportation: [address] at home: Walker__Cane__RTS__ BSC__Shower Chair__  02__ HHN__ CPAP__  BiPap__  Hospital Bed__ W/C___ Other__________    Services in the home and/or outpatient, prior to admission: none    Dialysis Facility (if applicable) none  · Name:  · Address:  · Dialysis Schedule:  · Phone:  · Fax:    PT/OT recs: none    Hospital Exemption Notification (HEN): needed for SNF    Barriers to discharge: none    Plan/comments: Patient plans on returning home at discharge. Reports has support of multiple family members. Drives will be able to get to follow up appointments. Denied any dcp needs. Please notify should needs arise.  Taniya Maynard RN      [facility] on chart for MD signature

## 2019-12-28 ENCOUNTER — APPOINTMENT (OUTPATIENT)
Dept: NUCLEAR MEDICINE | Age: 84
DRG: 291 | End: 2019-12-28
Payer: MEDICARE

## 2019-12-28 LAB
ANION GAP SERPL CALCULATED.3IONS-SCNC: 14 MMOL/L (ref 3–16)
BUN BLDV-MCNC: 10 MG/DL (ref 7–20)
CALCIUM SERPL-MCNC: 9.5 MG/DL (ref 8.3–10.6)
CHLORIDE BLD-SCNC: 94 MMOL/L (ref 99–110)
CHOLESTEROL, TOTAL: 146 MG/DL (ref 0–199)
CO2: 32 MMOL/L (ref 21–32)
CREAT SERPL-MCNC: 0.8 MG/DL (ref 0.6–1.2)
GFR AFRICAN AMERICAN: >60
GFR NON-AFRICAN AMERICAN: >60
GLUCOSE BLD-MCNC: 160 MG/DL (ref 70–99)
HDLC SERPL-MCNC: 57 MG/DL (ref 40–60)
LDL CHOLESTEROL CALCULATED: 64 MG/DL
LV EF: 58 %
LV EF: 79 %
LVEF MODALITY: NORMAL
LVEF MODALITY: NORMAL
MAGNESIUM: 1.9 MG/DL (ref 1.8–2.4)
POTASSIUM REFLEX MAGNESIUM: 3 MMOL/L (ref 3.5–5.1)
PRO-BNP: 727 PG/ML (ref 0–449)
SODIUM BLD-SCNC: 140 MMOL/L (ref 136–145)
TRIGL SERPL-MCNC: 125 MG/DL (ref 0–150)
VLDLC SERPL CALC-MCNC: 25 MG/DL

## 2019-12-28 PROCEDURE — 93306 TTE W/DOPPLER COMPLETE: CPT

## 2019-12-28 PROCEDURE — 6360000002 HC RX W HCPCS: Performed by: INTERNAL MEDICINE

## 2019-12-28 PROCEDURE — 83880 ASSAY OF NATRIURETIC PEPTIDE: CPT

## 2019-12-28 PROCEDURE — 78452 HT MUSCLE IMAGE SPECT MULT: CPT

## 2019-12-28 PROCEDURE — 36415 COLL VENOUS BLD VENIPUNCTURE: CPT

## 2019-12-28 PROCEDURE — 83735 ASSAY OF MAGNESIUM: CPT

## 2019-12-28 PROCEDURE — 6370000000 HC RX 637 (ALT 250 FOR IP): Performed by: INTERNAL MEDICINE

## 2019-12-28 PROCEDURE — 99232 SBSQ HOSP IP/OBS MODERATE 35: CPT | Performed by: NURSE PRACTITIONER

## 2019-12-28 PROCEDURE — A9502 TC99M TETROFOSMIN: HCPCS | Performed by: INTERNAL MEDICINE

## 2019-12-28 PROCEDURE — 6370000000 HC RX 637 (ALT 250 FOR IP): Performed by: NURSE PRACTITIONER

## 2019-12-28 PROCEDURE — 1200000000 HC SEMI PRIVATE

## 2019-12-28 PROCEDURE — 2580000003 HC RX 258: Performed by: INTERNAL MEDICINE

## 2019-12-28 PROCEDURE — 93017 CV STRESS TEST TRACING ONLY: CPT

## 2019-12-28 PROCEDURE — 80048 BASIC METABOLIC PNL TOTAL CA: CPT

## 2019-12-28 PROCEDURE — 3430000000 HC RX DIAGNOSTIC RADIOPHARMACEUTICAL: Performed by: INTERNAL MEDICINE

## 2019-12-28 PROCEDURE — 80061 LIPID PANEL: CPT

## 2019-12-28 RX ORDER — POTASSIUM CHLORIDE 20 MEQ/1
20 TABLET, EXTENDED RELEASE ORAL ONCE
Status: COMPLETED | OUTPATIENT
Start: 2019-12-28 | End: 2019-12-28

## 2019-12-28 RX ADMIN — FUROSEMIDE 40 MG: 10 INJECTION, SOLUTION INTRAMUSCULAR; INTRAVENOUS at 18:53

## 2019-12-28 RX ADMIN — FAMOTIDINE 20 MG: 20 TABLET, FILM COATED ORAL at 11:46

## 2019-12-28 RX ADMIN — TETROFOSMIN 31.6 MILLICURIE: 1.38 INJECTION, POWDER, LYOPHILIZED, FOR SOLUTION INTRAVENOUS at 09:10

## 2019-12-28 RX ADMIN — POTASSIUM CHLORIDE 20 MEQ: 20 TABLET, EXTENDED RELEASE ORAL at 15:53

## 2019-12-28 RX ADMIN — DILTIAZEM HYDROCHLORIDE 240 MG: 240 CAPSULE, EXTENDED RELEASE ORAL at 11:46

## 2019-12-28 RX ADMIN — CLONIDINE HYDROCHLORIDE 0.2 MG: 0.1 TABLET ORAL at 11:46

## 2019-12-28 RX ADMIN — POTASSIUM CHLORIDE 20 MEQ: 20 TABLET, EXTENDED RELEASE ORAL at 20:48

## 2019-12-28 RX ADMIN — POTASSIUM CHLORIDE 20 MEQ: 20 TABLET, EXTENDED RELEASE ORAL at 11:46

## 2019-12-28 RX ADMIN — Medication 10 ML: at 20:48

## 2019-12-28 RX ADMIN — FUROSEMIDE 40 MG: 10 INJECTION, SOLUTION INTRAMUSCULAR; INTRAVENOUS at 11:47

## 2019-12-28 RX ADMIN — CLONIDINE HYDROCHLORIDE 0.2 MG: 0.1 TABLET ORAL at 20:48

## 2019-12-28 RX ADMIN — ATORVASTATIN CALCIUM 40 MG: 40 TABLET, FILM COATED ORAL at 20:47

## 2019-12-28 RX ADMIN — LOSARTAN POTASSIUM 100 MG: 100 TABLET, FILM COATED ORAL at 11:47

## 2019-12-28 RX ADMIN — TETROFOSMIN 10.6 MILLICURIE: 1.38 INJECTION, POWDER, LYOPHILIZED, FOR SOLUTION INTRAVENOUS at 07:52

## 2019-12-28 RX ADMIN — MELATONIN TAB 5 MG 5 MG: 5 TAB at 20:48

## 2019-12-28 RX ADMIN — METOPROLOL SUCCINATE 200 MG: 50 TABLET, EXTENDED RELEASE ORAL at 11:46

## 2019-12-28 RX ADMIN — Medication 10 ML: at 11:47

## 2019-12-28 RX ADMIN — RIVAROXABAN 15 MG: 15 TABLET, FILM COATED ORAL at 13:57

## 2019-12-28 RX ADMIN — REGADENOSON 0.4 MG: 0.08 INJECTION, SOLUTION INTRAVENOUS at 09:10

## 2019-12-28 ASSESSMENT — ENCOUNTER SYMPTOMS
COUGH: 0
SHORTNESS OF BREATH: 1
CHEST TIGHTNESS: 0

## 2019-12-28 NOTE — PROGRESS NOTES
[facility]  Cardiology  Progress Note    Admission date:  2019    Reason for follow up visit: SOB, CHF, elevated tropnin    HPI/CC: Kermit Ruiz is a 80 y.o. female who presented to the hospital with complaints of shortness of breath and weight gain fernanda 10 lbs over the last two weeks. She is being duiresed with Lasix IV 40 mg BID, stress test and echocardiogram are to be completed today. Subjective: Ms. Carole Brooks sitting up in wheelchair in stress lab. Stated her shortness of breath and edema has improved. Denies chest pain, palpitations and dizziness. Vitals:  Blood pressure (!) 159/79, pulse 58, temperature 97.3 °F (36.3 °C), temperature source Oral, resp. rate 16, height 5' 3\" (1.6 m), weight 162 lb 14.4 oz (73.9 kg), SpO2 93 %.   Temp  Av.7 °F (36.5 °C)  Min: 97.3 °F (36.3 °C)  Max: 97.9 °F (36.6 °C)  Pulse  Av.6  Min: 54  Max: 75  BP  Min: 155/88  Max: 184/72  SpO2  Av.4 %  Min: 92 %  Max: 95 %    24 hour I/O    Intake/Output Summary (Last 24 hours) at 2019 0804  Last data filed at 2019 0039  Gross per 24 hour   Intake 540 ml   Output 3400 ml   Net -2860 ml     Current Facility-Administered Medications   Medication Dose Route Frequency Provider Last Rate Last Dose    potassium chloride (KLOR-CON M) extended release tablet 20 mEq  20 mEq Oral BID Catalina Mayfield MD   20 mEq at 19    aspirin EC tablet 81 mg  81 mg Oral Once Luis Alberto Kenney MD        atorvastatin (LIPITOR) tablet 40 mg  40 mg Oral Nightly Luis Alberto Kenney MD   40 mg at 19 152    cloNIDine (CATAPRES) tablet 0.2 mg  0.2 mg Oral BID Luis Alberto Kenney MD   0.2 mg at 19    regadenoson (LEXISCAN) injection 0.4 mg  0.4 mg Intravenous ONCE PRN Luis Alberto Kenney MD        melatonin tablet 5 mg  5 mg Oral Nightly PRN Collin Sanchez MD   5 mg at 19    diltiazem (CARDIZEM CD) extended release capsule 240 mg  240 mg Oral Daily Mag Burns MD 240 mg at 12/27/19 0950    famotidine (PEPCID) tablet 20 mg  20 mg Oral Daily Paula Navarro MD   20 mg at 12/27/19 0950    levothyroxine (SYNTHROID) tablet 88 mcg  88 mcg Oral Daily Paula Navarro MD   Stopped at 12/28/19 0556    losartan (COZAAR) tablet 100 mg  100 mg Oral Daily Paula Navarro MD   100 mg at 12/27/19 8421    metoprolol succinate (TOPROL XL) extended release tablet 200 mg  200 mg Oral Daily Paula Navarro MD   200 mg at 12/27/19 0950    rivaroxaban (XARELTO) tablet 15 mg  15 mg Oral Daily with breakfast Paula Navarro MD   15 mg at 12/27/19 0950    sodium chloride flush 0.9 % injection 10 mL  10 mL Intravenous 2 times per day Paula Navarro MD   10 mL at 12/27/19 2157    sodium chloride flush 0.9 % injection 10 mL  10 mL Intravenous PRN Paula Navarro MD        magnesium hydroxide (MILK OF MAGNESIA) 400 MG/5ML suspension 30 mL  30 mL Oral Daily PRN Paula Navarro MD        acetaminophen (TYLENOL) tablet 650 mg  650 mg Oral Q4H PRN Paula Navarro MD        prochlorperazine (COMPAZINE) injection 10 mg  10 mg Intravenous Q6H PRN Paula Navarro MD        perflutren lipid microspheres (DEFINITY) injection 1.65 mg  1.5 mL Intravenous ONCE PRN Paula Navarro MD        furosemide (LASIX) injection 40 mg  40 mg Intravenous BID Paula Navarro MD   40 mg at 12/27/19 1757       Review of Systems   Constitutional: Positive for activity change and fatigue. Respiratory: Positive for shortness of breath. Negative for cough and chest tightness. Cardiovascular: Positive for leg swelling. Negative for chest pain and palpitations. Neurological: Positive for weakness. Negative for dizziness. Psychiatric/Behavioral: Positive for sleep disturbance.        Objective:     Telemetry monitor: SB overnight (asymtomatic) 49-60's otherwise NSR 60-70's with isolated PVCs    Physical Exam:  Constitutional:  Comfortable and alert, NAD, appears younger than stated age  Eyes: PERRL, sclera nonicteric  Neck:  Supple, no masses, no thyroidmegaly, JVD unable to assess at this time   Skin:  Warm and dry; no rash or lesions  Heart: Regular, normal apex, S1 and S2 normal,  +AMA no G/R  Lungs:  Normal respiratory effort; clear; bibasilar crackles, no wheezing/rhonchi  Abdomen: soft, non tender, + bowel sounds  Extremities:  3+ BLE edema- BLE wrapped with ace wraps  Neuro: alert and oriented, moves legs and arms equally, normal mood and affect      Data Reviewed:  CXR 12/26/2019:  Impression   Small bilateral subpulmonic pleural effusions.  No definite infiltrate           Echo 4/24/2015:  Normal left ventricle size, wall thickness and systolic function with an  estimated ejection fraction of 55%. No regional wall motion abnormalities are seen. Mild mitral annular calcification is present. Mild mitral, tricuspid, and pulmonic regurgitation. Aortic valve appears mildly sclerotic but opens adequately. Systolic pulmonary artery pressure (SPAP) is estimated at 19mmHg (RA  pressure of 3 mmHg) consistent with normal pulmonary hypertension.          Lab Reviewed:   Renal Profile:  Lab Results   Component Value Date    CREATININE 0.9 12/27/2019    BUN 12 12/27/2019     12/27/2019    K 2.9 12/27/2019    K 3.1 12/26/2019    CL 97 12/27/2019    CO2 30 12/27/2019     CBC:    Lab Results   Component Value Date    WBC 4.8 12/27/2019    RBC 4.53 12/27/2019    HGB 12.1 12/27/2019    HCT 36.0 12/27/2019    MCV 79.4 12/27/2019    RDW 16.0 12/27/2019     12/27/2019     BNP:    Lab Results   Component Value Date    PROBNP 1,293 12/26/2019     Fasting Lipid Panel:    Lab Results   Component Value Date    CHOL 189 09/26/2017    HDL 48 09/26/2017    TRIG 179 09/26/2017     Cardiac Enzymes:  CK/MbTroponin  Lab Results   Component Value Date    TROPONINI <0.01 12/27/2019     PT/ INR   Lab Results   Component Value Date    INR 3.69 12/26/2019    INR 1.29 05/12/2015    INR 1.76 05/06/2015    PROTIME 43.4 12/26/2019 PROTIME 14.1 05/12/2015    PROTIME 19.6 05/06/2015     PTT No results found for: PTT   Lab Results   Component Value Date    MG 1.70 12/27/2019    No results found for: TSH    All labs and imaging reviewed today    Assessment:  1. Acute on Chronic diastolic CHF:remains fluid overloaded, good diuresis noted so far   -weights 193-->187-->162   -net negative: 4,620   -BNP on admission 1,293  2. Elevated troponin: 0.03, 0.01  3. ZAMBRANO: improved  4. HTN: suboptimal, but overall improved from yesterday  5. PAF: NSR at this time  5. HLD:  in 2017- will add on to am labs  6. Edema: remains, improved      Plan:   1. BMP and BNP this am - spoke with Yessica Doan in stress lab will switch to stat also added on Lipids  2. Stress test and echocardiogram today, further recommendation pending once these tests are resulted. 3. Continue IV Lasix 40 mg BID pending am labs  4. Continue Clonidine, ASA, Lipitor, Xarelto, Toprol XL, Cozaar, and Cardizem  5.  Stict I/O, daily weights, low sodium diet, 2000 ml fluid restriction    Uma Miller, APRN - [address]  [phone]

## 2019-12-28 NOTE — PLAN OF CARE
Problem: OXYGENATION/RESPIRATORY FUNCTION  Goal: Patient will maintain patent airway  Outcome: Ongoing   Monitor oxygen and give prn if needed. Problem: FLUID AND ELECTROLYTE IMBALANCE  Goal: Fluid and electrolyte balance are achieved/maintained  Outcome: Ongoing   Monitor intake and output every shift.

## 2019-12-28 NOTE — PLAN OF CARE
Patient's EF (Ejection Fraction) is greater than 40%    Heart Failure Medications:  Diuretics[de-identified] Furosemide  ACE[de-identified] None  ARB[de-identified] Losartan  ARNI[de-identified] None  Evidenced Based Beta Blocker[de-identified] Metoprolol SUCCinate- Toprol XL    Patient's weights and intake/output reviewed: Yes    Patient's Last Weight: 162lbs 14.4 oz lbs obtained by standing scale. Difference of 15 lbs less than last documented weight. Intake/Output Summary (Last 24 hours) at 12/28/2019 1634  Last data filed at 12/28/2019 1554  Gross per 24 hour   Intake 960 ml   Output 3925 ml   Net -2965 ml       Comorbidities Reviewed Yes    Patient has a past medical history of CKD (chronic kidney disease) stage 3, GFR 30-59 ml/min ([address] Diabetes mellitus ([address] Hyperlipidemia, Hypertension, and Thyroid disease. >>For CHF and Comorbidity documentation on Education Time and Topics, please see Education Tab    Patient stated Daily Functional Goal: to reduce swelling and shortness of breath. Pt up in chair at this time on room air. Pt with complaints of shortness of breath. Pt with pitting lower extremity edema.      Patient and/or Family's stated Goal of Care this Admission: increase activity tolerance, be more comfortable and reduce lower extremity edema prior to discharge

## 2019-12-28 NOTE — PROGRESS NOTES
Hospitalist Progress Note      PCP: Ashley Llamas MD    Date of Admission: 12/26/2019    Chief Complaint: Shortness of breath, swollen lower and upper extremity    Hospital Course: See H&P    Subjective:   Patient is up in bed, comfortable, not in distress. Denies any chest pain or shortness of breath. No new event overnight noted. Medications:  Reviewed    Infusion Medications   Scheduled Medications    potassium chloride  20 mEq Oral BID    aspirin  81 mg Oral Once    atorvastatin  40 mg Oral Nightly    cloNIDine  0.2 mg Oral BID    diltiazem  240 mg Oral Daily    famotidine  20 mg Oral Daily    levothyroxine  88 mcg Oral Daily    losartan  100 mg Oral Daily    metoprolol succinate  200 mg Oral Daily    rivaroxaban  15 mg Oral Daily with breakfast    sodium chloride flush  10 mL Intravenous 2 times per day    furosemide  40 mg Intravenous BID     PRN Meds: melatonin, sodium chloride flush, magnesium hydroxide, acetaminophen, prochlorperazine, perflutren lipid microspheres      Intake/Output Summary (Last 24 hours) at 12/28/2019 1048  Last data filed at 12/28/2019 0774  Gross per 24 hour   Intake 360 ml   Output 3500 ml   Net -3140 ml       Physical Exam Performed:    BP (!) 159/79   Pulse 58   Temp 97.3 °F (36.3 °C) (Oral)   Resp 16   Ht 5' 3\" (1.6 m)   Wt 162 lb 14.4 oz (73.9 kg)   SpO2 93%   BMI 28.86 kg/m²     General appearance: No apparent distress, appears stated age and cooperative. HEENT: Pupils equal, round, and reactive to light. Conjunctivae/corneas clear. Neck: Supple, with full range of motion. No jugular venous distention. Trachea midline. Respiratory:  Normal respiratory effort. Clear to auscultation, bilaterally without Rales/Wheezes/Rhonchi. Cardiovascular: Regular rate and rhythm with normal S1/S2 without murmurs, rubs or gallops. Abdomen: Soft, non-tender, non-distended with normal bowel sounds.   Musculoskeletal: No clubbing, cyanosis , 3+edema She started sleeping in a recliner a couple days ago. The patient says that she usually takes her PRN furosemide about 3x/week, but lately she has hardly been taking it because she hates having to urinate often. CXR showed pleural effusions. Her weight was 183 lbs three months ago at her cardiology clinic appointment, on admission here she weighed 193 lbs. Troponin was slightly elevated, patient denied chest pain. She has no listed h/o CHF, and EF was normal 4 years ago.          Acute diastolic CHF  - furosemide IV  - continue losartan and metoprolol  - f/u TTE  -Clinically improving gradually, continue IV Lasix, compression stocking on both lower extremity. Cardiology input noted, plan to check 2D echo and NM stress test.     HTN  - continue metoprolol  - continue losartan (stopped redundant irbesartan)  - stopped felodipine since she is on dilt.     Trop elevation  - likely demand ischemia due to above issues. TTE as above. No further ischemia workup anticipated.      Afib  - rivaroxaban     Hypothyroidism  - Clinically euthyroid. Continue home dose of levothyroxine. TSH was normal last month with endocrinology clinic.     DM2  - recent A1c was 7, she follows with endocrinology.   Could consider metformin, defer to outpatient management.      HLD  - statin    DVT Prophylaxis: Xarelto  Diet: Diet NPO, After Midnight  Code Status: Full Code    PT/OT Eval Status: Ambulatory    Dispo -in 1 to 2 days    Apolonia Garcia MD

## 2019-12-29 LAB
ANION GAP SERPL CALCULATED.3IONS-SCNC: 14 MMOL/L (ref 3–16)
BUN BLDV-MCNC: 14 MG/DL (ref 7–20)
CALCIUM SERPL-MCNC: 9.4 MG/DL (ref 8.3–10.6)
CHLORIDE BLD-SCNC: 94 MMOL/L (ref 99–110)
CO2: 33 MMOL/L (ref 21–32)
CREAT SERPL-MCNC: 0.8 MG/DL (ref 0.6–1.2)
GFR AFRICAN AMERICAN: >60
GFR NON-AFRICAN AMERICAN: >60
GLUCOSE BLD-MCNC: 131 MG/DL (ref 70–99)
POTASSIUM REFLEX MAGNESIUM: 3.6 MMOL/L (ref 3.5–5.1)
SODIUM BLD-SCNC: 141 MMOL/L (ref 136–145)

## 2019-12-29 PROCEDURE — 6370000000 HC RX 637 (ALT 250 FOR IP): Performed by: INTERNAL MEDICINE

## 2019-12-29 PROCEDURE — 2580000003 HC RX 258: Performed by: INTERNAL MEDICINE

## 2019-12-29 PROCEDURE — 80048 BASIC METABOLIC PNL TOTAL CA: CPT

## 2019-12-29 PROCEDURE — 99232 SBSQ HOSP IP/OBS MODERATE 35: CPT | Performed by: NURSE PRACTITIONER

## 2019-12-29 PROCEDURE — 36415 COLL VENOUS BLD VENIPUNCTURE: CPT

## 2019-12-29 PROCEDURE — 1200000000 HC SEMI PRIVATE

## 2019-12-29 PROCEDURE — 6370000000 HC RX 637 (ALT 250 FOR IP): Performed by: NURSE PRACTITIONER

## 2019-12-29 PROCEDURE — 6360000002 HC RX W HCPCS: Performed by: INTERNAL MEDICINE

## 2019-12-29 RX ORDER — METOPROLOL SUCCINATE 50 MG/1
150 TABLET, EXTENDED RELEASE ORAL DAILY
Status: DISCONTINUED | OUTPATIENT
Start: 2019-12-29 | End: 2019-12-31

## 2019-12-29 RX ADMIN — CLONIDINE HYDROCHLORIDE 0.2 MG: 0.1 TABLET ORAL at 20:30

## 2019-12-29 RX ADMIN — RIVAROXABAN 15 MG: 15 TABLET, FILM COATED ORAL at 09:59

## 2019-12-29 RX ADMIN — POTASSIUM CHLORIDE 20 MEQ: 20 TABLET, EXTENDED RELEASE ORAL at 09:59

## 2019-12-29 RX ADMIN — LOSARTAN POTASSIUM 100 MG: 100 TABLET, FILM COATED ORAL at 09:59

## 2019-12-29 RX ADMIN — DILTIAZEM HYDROCHLORIDE 240 MG: 240 CAPSULE, EXTENDED RELEASE ORAL at 09:59

## 2019-12-29 RX ADMIN — FUROSEMIDE 40 MG: 10 INJECTION, SOLUTION INTRAMUSCULAR; INTRAVENOUS at 09:59

## 2019-12-29 RX ADMIN — FAMOTIDINE 20 MG: 20 TABLET, FILM COATED ORAL at 09:59

## 2019-12-29 RX ADMIN — ATORVASTATIN CALCIUM 40 MG: 40 TABLET, FILM COATED ORAL at 20:30

## 2019-12-29 RX ADMIN — Medication 10 ML: at 20:31

## 2019-12-29 RX ADMIN — POTASSIUM CHLORIDE 20 MEQ: 20 TABLET, EXTENDED RELEASE ORAL at 20:30

## 2019-12-29 RX ADMIN — METOPROLOL SUCCINATE 150 MG: 50 TABLET, EXTENDED RELEASE ORAL at 09:59

## 2019-12-29 RX ADMIN — MELATONIN TAB 5 MG 5 MG: 5 TAB at 20:30

## 2019-12-29 RX ADMIN — Medication 10 ML: at 09:59

## 2019-12-29 RX ADMIN — CLONIDINE HYDROCHLORIDE 0.2 MG: 0.1 TABLET ORAL at 09:59

## 2019-12-29 RX ADMIN — FUROSEMIDE 40 MG: 10 INJECTION, SOLUTION INTRAMUSCULAR; INTRAVENOUS at 17:40

## 2019-12-29 RX ADMIN — LEVOTHYROXINE SODIUM 88 MCG: 0.09 TABLET ORAL at 05:12

## 2019-12-29 ASSESSMENT — ENCOUNTER SYMPTOMS
SHORTNESS OF BREATH: 1
CHEST TIGHTNESS: 0
COUGH: 0

## 2019-12-29 ASSESSMENT — PAIN SCALES - GENERAL
PAINLEVEL_OUTOF10: 0
PAINLEVEL_OUTOF10: 0

## 2019-12-29 NOTE — PLAN OF CARE
Problem: Metabolic:  Goal: Ability to maintain appropriate glucose levels will improve  Description  Ability to maintain appropriate glucose levels will improve  12/28/2019 2239 by Adelaide Cain RN  Outcome: Ongoing   Continuing to monitor blood glucose levels

## 2019-12-29 NOTE — PROGRESS NOTES
tablet 20 mg  20 mg Oral Daily Kim Jimenez MD   20 mg at 12/28/19 1146    levothyroxine (SYNTHROID) tablet 88 mcg  88 mcg Oral Daily Kim Jimenez MD   88 mcg at 12/29/19 0512    losartan (COZAAR) tablet 100 mg  100 mg Oral Daily Kim Jimenez MD   100 mg at 12/28/19 1147    metoprolol succinate (TOPROL XL) extended release tablet 200 mg  200 mg Oral Daily Kim Jimenez MD   200 mg at 12/28/19 1146    rivaroxaban (XARELTO) tablet 15 mg  15 mg Oral Daily with breakfast Kim Jimenez MD   15 mg at 12/28/19 1357    sodium chloride flush 0.9 % injection 10 mL  10 mL Intravenous 2 times per day Kim Jimenez MD   10 mL at 12/28/19 2048    sodium chloride flush 0.9 % injection 10 mL  10 mL Intravenous PRN Kim Jimenez MD        magnesium hydroxide (MILK OF MAGNESIA) 400 MG/5ML suspension 30 mL  30 mL Oral Daily PRN Kim Jimenez MD        acetaminophen (TYLENOL) tablet 650 mg  650 mg Oral Q4H PRN Kim Jimenez MD        prochlorperazine (COMPAZINE) injection 10 mg  10 mg Intravenous Q6H PRN Kim Jimenez MD        perflutren lipid microspheres (DEFINITY) injection 1.65 mg  1.5 mL Intravenous ONCE PRN Kim Jimenez MD        furosemide (LASIX) injection 40 mg  40 mg Intravenous BID Kim Jimenez MD   40 mg at 12/28/19 1853       Review of Systems   Constitutional: Positive for activity change and fatigue. Respiratory: Positive for shortness of breath. Negative for cough and chest tightness. Cardiovascular: Positive for leg swelling. Negative for chest pain and palpitations. Neurological: Positive for weakness. Negative for dizziness. Psychiatric/Behavioral: Positive for sleep disturbance.        Objective:     Telemetry monitor: NSR/SB as low as mid 40's last night    Physical Exam:  Constitutional:  Comfortable and alert, NAD, appears younger than stated age  Eyes: PERRL, sclera nonicteric  Neck:  Supple, no masses, no thyroidmegaly, JVD unable to assess at this 12/27/2019    RDW 16.0 12/27/2019     12/27/2019     BNP:    Lab Results   Component Value Date    PROBNP 727 12/28/2019    PROBNP 1,293 12/26/2019     Fasting Lipid Panel:    Lab Results   Component Value Date    CHOL 146 12/28/2019    HDL 57 12/28/2019    TRIG 125 12/28/2019     Cardiac Enzymes:  CK/MbTroponin  Lab Results   Component Value Date    TROPONINI <0.01 12/27/2019     PT/ INR   Lab Results   Component Value Date    INR 3.69 12/26/2019    INR 1.29 05/12/2015    INR 1.76 05/06/2015    PROTIME 43.4 12/26/2019    PROTIME 14.1 05/12/2015    PROTIME 19.6 05/06/2015     PTT No results found for: PTT   Lab Results   Component Value Date    MG 1.90 12/28/2019    No results found for: TSH    All labs and imaging reviewed today    Assessment:  1. Acute on Chronic diastolic CHF:improved   -weights 193-->187-->162-->170? Accuracy of weights   -net negative: 7,250   -BNP on admission 1,293 down to 727 12/28  2. Elevated troponin: 0.03, 0.01  3. ZAMBRANO: improved  4. HTN: remains suboptimal, but overall improved since admission  5. PAF: NSR/SB at this time  5. HLD: LDL 64 on statin   6. Edema: remains, improved  7. Hypokalemia: 3.0 yesterday on scheduled potassium and one time dose of 20 mEq given yesterday- labs pending today    Plan:   1. Decrease Toprol XL to 150 mg daily due to bradycardia  2. Continue IV Lasix 40 mg BID, as kidney function allows- labs pending this am will need to go home on daily dose of Lasix, was on 20 mg as needed prior to admission  3. Continue Clonidine, ASA, Lipitor, Xarelto, Cozaar, and Cardizem  4. Stict I/O, daily weights, low sodium diet, 2000 ml fluid restriction  5. Detailed CHF education given to patient regarding daily weights, low sodium diet, fluid restriction, weight gain, edema, and shortness of breath.       Gloria Howard, APRN - [address]  [phone]

## 2019-12-29 NOTE — PROGRESS NOTES
Pt requested ace wraps be removed from legs for the night as they have been wrapped for two days, removed ace wraps from both legs.

## 2019-12-29 NOTE — PLAN OF CARE
Problem: OXYGENATION/RESPIRATORY FUNCTION  Goal: Patient will achieve/maintain normal respiratory rate/effort  Description  Respiratory rate and effort will be within normal limits for the patient  Outcome: Ongoing     Patient's EF (Ejection Fraction) is greater than 40%    Heart Failure Medications:  Diuretics[de-identified] Furosemide  ACE[de-identified] None  ARB[de-identified] Losartan  ARNI[de-identified] None  Evidenced Based Beta Blocker[de-identified] Metoprolol SUCCinate- Toprol XL    Patient's weights and intake/output reviewed: Yes    Patient's Last Weight: 162 lbs obtained by standing scale. Difference of 15 lbs less than last documented weight. Intake/Output Summary (Last 24 hours) at 12/28/2019 2237  Last data filed at 12/28/2019 1800  Gross per 24 hour   Intake 1080 ml   Output 3325 ml   Net -2245 ml       Comorbidities Reviewed Yes    Patient has a past medical history of CKD (chronic kidney disease) stage 3, GFR 30-59 ml/min ([address] Diabetes mellitus ([address] Hyperlipidemia, Hypertension, and Thyroid disease. >>For CHF and Comorbidity documentation on Education Time and Topics, please see Education Tab    Patient stated Daily Functional Goal: Increase activity level    Pt resting in bed at this time on 5 L O2. Pt with complaints of shortness of breath. Pt with pitting lower extremity edema.      Patient and/or Family's stated Goal of Care this Admission: reduce shortness of breath, increase activity tolerance, better understand heart failure and disease management, be more comfortable, and reduce lower extremity edema prior to discharge

## 2019-12-29 NOTE — PROGRESS NOTES
Hospitalist Progress Note      PCP: Sorin Little MD    Date of Admission: 12/26/2019    Chief Complaint: Shortness of breath, swollen lower and upper extremity    Hospital Course: See H&P    Subjective:   Patient is up in bed, comfortable, not in distress. Denies any chest pain or shortness of breath. No new event overnight noted. Medications:  Reviewed    Infusion Medications   Scheduled Medications    metoprolol succinate  150 mg Oral Daily    potassium chloride  20 mEq Oral BID    aspirin  81 mg Oral Once    atorvastatin  40 mg Oral Nightly    cloNIDine  0.2 mg Oral BID    diltiazem  240 mg Oral Daily    famotidine  20 mg Oral Daily    levothyroxine  88 mcg Oral Daily    losartan  100 mg Oral Daily    rivaroxaban  15 mg Oral Daily with breakfast    sodium chloride flush  10 mL Intravenous 2 times per day    furosemide  40 mg Intravenous BID     PRN Meds: melatonin, sodium chloride flush, magnesium hydroxide, acetaminophen, prochlorperazine, perflutren lipid microspheres      Intake/Output Summary (Last 24 hours) at 12/29/2019 1055  Last data filed at 12/29/2019 1003  Gross per 24 hour   Intake 1680 ml   Output 3950 ml   Net -2270 ml       Physical Exam Performed:    BP (!) 144/79   Pulse 54   Temp 97.4 °F (36.3 °C) (Oral)   Resp 16   Ht 5' 3\" (1.6 m)   Wt 170 lb 4.8 oz (77.2 kg)   SpO2 94%   BMI 30.17 kg/m²     General appearance: No apparent distress, appears stated age and cooperative. HEENT: Pupils equal, round, and reactive to light. Conjunctivae/corneas clear. Neck: Supple, with full range of motion. No jugular venous distention. Trachea midline. Respiratory:  Normal respiratory effort. Clear to auscultation, bilaterally without Rales/Wheezes/Rhonchi. Cardiovascular: Regular rate and rhythm with normal S1/S2 without murmurs, rubs or gallops. Abdomen: Soft, non-tender, non-distended with normal bowel sounds.   Musculoskeletal: No clubbing, cyanosis , 3+edema started sleeping in a recliner a couple days ago. The patient says that she usually takes her PRN furosemide about 3x/week, but lately she has hardly been taking it because she hates having to urinate often. CXR showed pleural effusions. Her weight was 183 lbs three months ago at her cardiology clinic appointment, on admission here she weighed 193 lbs. Troponin was slightly elevated, patient denied chest pain. She has no listed h/o CHF, and EF was normal 4 years ago.          Acute diastolic CHF  - furosemide IV  - continue losartan and metoprolol  - f/u TTE  -Clinically improving gradually, continue IV Lasix, compression stocking on both lower extremity. Cardiology input noted, plan to check 2D echo and NM stress test-both normal.  Clinically improving significantly with IV Lasix, patient has a net negative of 7 L on fluid balance.     HTN  - continue metoprolol  - continue losartan (stopped redundant irbesartan)  - stopped felodipine since she is on dilt.     Trop elevation  - likely demand ischemia due to above issues. TTE as above. No further ischemia workup anticipated.      Afib  - rivaroxaban     Hypothyroidism  - Clinically euthyroid. Continue home dose of levothyroxine. TSH was normal last month with endocrinology clinic.     DM2  - recent A1c was 7, she follows with endocrinology.   Could consider metformin, defer to outpatient management.      HLD  - statin    DVT Prophylaxis: Xarelto  Diet: DIET CARDIAC;  Code Status: Full Code    PT/OT Eval Status: Ambulatory    Dispo -in 1 to 2 days    Anthony Bay MD

## 2019-12-29 NOTE — PROGRESS NOTES
Notified by Bozena Lal that pt had just over 2 second pause. RN was at bedside when this occurred, pt resting in bed and asymptomatic, VSS. Will continue to monitor.

## 2019-12-30 LAB
ANION GAP SERPL CALCULATED.3IONS-SCNC: 13 MMOL/L (ref 3–16)
BUN BLDV-MCNC: 19 MG/DL (ref 7–20)
CALCIUM SERPL-MCNC: 9.5 MG/DL (ref 8.3–10.6)
CHLORIDE BLD-SCNC: 92 MMOL/L (ref 99–110)
CO2: 33 MMOL/L (ref 21–32)
CREAT SERPL-MCNC: 1 MG/DL (ref 0.6–1.2)
GFR AFRICAN AMERICAN: >60
GFR NON-AFRICAN AMERICAN: 53
GLUCOSE BLD-MCNC: 168 MG/DL (ref 70–99)
MAGNESIUM: 1.7 MG/DL (ref 1.8–2.4)
POTASSIUM SERPL-SCNC: 3.7 MMOL/L (ref 3.5–5.1)
SODIUM BLD-SCNC: 138 MMOL/L (ref 136–145)

## 2019-12-30 PROCEDURE — 6370000000 HC RX 637 (ALT 250 FOR IP): Performed by: INTERNAL MEDICINE

## 2019-12-30 PROCEDURE — 6360000002 HC RX W HCPCS: Performed by: INTERNAL MEDICINE

## 2019-12-30 PROCEDURE — 6370000000 HC RX 637 (ALT 250 FOR IP): Performed by: NURSE PRACTITIONER

## 2019-12-30 PROCEDURE — 2580000003 HC RX 258: Performed by: INTERNAL MEDICINE

## 2019-12-30 PROCEDURE — 83735 ASSAY OF MAGNESIUM: CPT

## 2019-12-30 PROCEDURE — 99232 SBSQ HOSP IP/OBS MODERATE 35: CPT | Performed by: NURSE PRACTITIONER

## 2019-12-30 PROCEDURE — 36415 COLL VENOUS BLD VENIPUNCTURE: CPT

## 2019-12-30 PROCEDURE — 80048 BASIC METABOLIC PNL TOTAL CA: CPT

## 2019-12-30 PROCEDURE — 1200000000 HC SEMI PRIVATE

## 2019-12-30 RX ORDER — FUROSEMIDE 40 MG/1
40 TABLET ORAL DAILY
Status: DISCONTINUED | OUTPATIENT
Start: 2019-12-31 | End: 2019-12-31 | Stop reason: HOSPADM

## 2019-12-30 RX ORDER — MAGNESIUM SULFATE IN WATER 40 MG/ML
2 INJECTION, SOLUTION INTRAVENOUS ONCE
Status: COMPLETED | OUTPATIENT
Start: 2019-12-30 | End: 2019-12-30

## 2019-12-30 RX ADMIN — MAGNESIUM SULFATE HEPTAHYDRATE 2 G: 40 INJECTION, SOLUTION INTRAVENOUS at 14:15

## 2019-12-30 RX ADMIN — RIVAROXABAN 15 MG: 15 TABLET, FILM COATED ORAL at 08:36

## 2019-12-30 RX ADMIN — DILTIAZEM HYDROCHLORIDE 240 MG: 240 CAPSULE, EXTENDED RELEASE ORAL at 08:36

## 2019-12-30 RX ADMIN — ATORVASTATIN CALCIUM 40 MG: 40 TABLET, FILM COATED ORAL at 21:14

## 2019-12-30 RX ADMIN — CLONIDINE HYDROCHLORIDE 0.2 MG: 0.1 TABLET ORAL at 08:36

## 2019-12-30 RX ADMIN — LEVOTHYROXINE SODIUM 88 MCG: 0.09 TABLET ORAL at 05:54

## 2019-12-30 RX ADMIN — MELATONIN TAB 5 MG 5 MG: 5 TAB at 21:28

## 2019-12-30 RX ADMIN — FAMOTIDINE 20 MG: 20 TABLET, FILM COATED ORAL at 08:36

## 2019-12-30 RX ADMIN — POTASSIUM CHLORIDE 20 MEQ: 20 TABLET, EXTENDED RELEASE ORAL at 21:14

## 2019-12-30 RX ADMIN — METOPROLOL SUCCINATE 150 MG: 50 TABLET, EXTENDED RELEASE ORAL at 08:36

## 2019-12-30 RX ADMIN — Medication 10 ML: at 21:14

## 2019-12-30 RX ADMIN — FUROSEMIDE 40 MG: 10 INJECTION, SOLUTION INTRAMUSCULAR; INTRAVENOUS at 08:36

## 2019-12-30 RX ADMIN — CLONIDINE HYDROCHLORIDE 0.2 MG: 0.1 TABLET ORAL at 21:14

## 2019-12-30 RX ADMIN — LOSARTAN POTASSIUM 100 MG: 100 TABLET, FILM COATED ORAL at 08:36

## 2019-12-30 RX ADMIN — POTASSIUM CHLORIDE 20 MEQ: 20 TABLET, EXTENDED RELEASE ORAL at 08:36

## 2019-12-30 RX ADMIN — Medication 10 ML: at 08:37

## 2019-12-30 ASSESSMENT — ENCOUNTER SYMPTOMS
RESPIRATORY NEGATIVE: 1
GASTROINTESTINAL NEGATIVE: 1

## 2019-12-30 ASSESSMENT — PAIN SCALES - GENERAL
PAINLEVEL_OUTOF10: 0

## 2019-12-30 NOTE — PLAN OF CARE
Problem: Serum Glucose Level - Abnormal:  Goal: Ability to maintain appropriate glucose levels will improve  Description  Ability to maintain appropriate glucose levels will improve  Outcome: Ongoing   Continuing to monitor blood glucose levels

## 2019-12-30 NOTE — PLAN OF CARE
Patient's EF (Ejection Fraction) is greater than 40%    Heart Failure Medications:  Diuretics[de-identified] Furosemide  ACE[de-identified] None  ARB[de-identified] Losartan  ARNI[de-identified] None  Evidenced Based Beta Blocker[de-identified] Metoprolol SUCCinate- Toprol XL    Patient's weights and intake/output reviewed: Yes    Patient's Last Weight: 170 lbs obtained by standing scale. Difference of 8 lbs more than last documented weight. Intake/Output Summary (Last 24 hours) at 12/29/2019 1934  Last data filed at 12/29/2019 1519  Gross per 24 hour   Intake 1080 ml   Output 2850 ml   Net -1770 ml       Comorbidities Reviewed Yes    Patient has a past medical history of CKD (chronic kidney disease) stage 3, GFR 30-59 ml/min ([address] Diabetes mellitus ([address] Hyperlipidemia, Hypertension, and Thyroid disease. >>For CHF and Comorbidity documentation on Education Time and Topics, please see Education Tab    Patient stated Daily Functional Goal: to decrease swelling      Pt sitting in bed at this time on room air. Pt denies shortness of breath. Pt with pitting lower extremity edema.      Patient and/or Family's stated Goal of Care this Admission: reduce shortness of breath, increase activity tolerance, better understand heart failure and disease management, be more comfortable and reduce lower extremity edema prior to discharge

## 2019-12-31 VITALS
OXYGEN SATURATION: 93 % | HEART RATE: 53 BPM | WEIGHT: 163.3 LBS | BODY MASS INDEX: 28.93 KG/M2 | SYSTOLIC BLOOD PRESSURE: 126 MMHG | DIASTOLIC BLOOD PRESSURE: 74 MMHG | TEMPERATURE: 97.5 F | RESPIRATION RATE: 16 BRPM | HEIGHT: 63 IN

## 2019-12-31 LAB
ALBUMIN SERPL-MCNC: 3.7 G/DL (ref 3.4–5)
ANION GAP SERPL CALCULATED.3IONS-SCNC: 13 MMOL/L (ref 3–16)
BUN BLDV-MCNC: 28 MG/DL (ref 7–20)
CALCIUM SERPL-MCNC: 9.8 MG/DL (ref 8.3–10.6)
CHLORIDE BLD-SCNC: 95 MMOL/L (ref 99–110)
CO2: 30 MMOL/L (ref 21–32)
CREAT SERPL-MCNC: 1.2 MG/DL (ref 0.6–1.2)
GFR AFRICAN AMERICAN: 52
GFR NON-AFRICAN AMERICAN: 43
GLUCOSE BLD-MCNC: 173 MG/DL (ref 70–99)
PHOSPHORUS: 3.2 MG/DL (ref 2.5–4.9)
POTASSIUM SERPL-SCNC: 4 MMOL/L (ref 3.5–5.1)
SODIUM BLD-SCNC: 138 MMOL/L (ref 136–145)

## 2019-12-31 PROCEDURE — 36415 COLL VENOUS BLD VENIPUNCTURE: CPT

## 2019-12-31 PROCEDURE — 6370000000 HC RX 637 (ALT 250 FOR IP): Performed by: INTERNAL MEDICINE

## 2019-12-31 PROCEDURE — 99233 SBSQ HOSP IP/OBS HIGH 50: CPT | Performed by: NURSE PRACTITIONER

## 2019-12-31 PROCEDURE — 6370000000 HC RX 637 (ALT 250 FOR IP): Performed by: NURSE PRACTITIONER

## 2019-12-31 PROCEDURE — 2580000003 HC RX 258: Performed by: INTERNAL MEDICINE

## 2019-12-31 PROCEDURE — 80069 RENAL FUNCTION PANEL: CPT

## 2019-12-31 RX ORDER — LOSARTAN POTASSIUM 100 MG/1
100 TABLET ORAL DAILY
Qty: 30 TABLET | Refills: 3 | Status: SHIPPED | OUTPATIENT
Start: 2019-12-31 | End: 2020-04-27 | Stop reason: SDUPTHER

## 2019-12-31 RX ORDER — METOPROLOL SUCCINATE 100 MG/1
100 TABLET, EXTENDED RELEASE ORAL DAILY
Qty: 30 TABLET | Refills: 3 | Status: SHIPPED | OUTPATIENT
Start: 2020-01-01 | End: 2020-05-05

## 2019-12-31 RX ORDER — ASPIRIN 81 MG/1
81 TABLET ORAL ONCE
Qty: 30 TABLET | Refills: 3 | Status: SHIPPED | OUTPATIENT
Start: 2019-12-31 | End: 2020-01-20

## 2019-12-31 RX ORDER — METOPROLOL SUCCINATE 50 MG/1
150 TABLET, EXTENDED RELEASE ORAL DAILY
Qty: 30 TABLET | Refills: 3 | Status: SHIPPED | OUTPATIENT
Start: 2020-01-01 | End: 2019-12-31 | Stop reason: HOSPADM

## 2019-12-31 RX ORDER — METOPROLOL SUCCINATE 50 MG/1
100 TABLET, EXTENDED RELEASE ORAL DAILY
Status: DISCONTINUED | OUTPATIENT
Start: 2020-01-01 | End: 2019-12-31 | Stop reason: HOSPADM

## 2019-12-31 RX ORDER — FUROSEMIDE 40 MG/1
40 TABLET ORAL DAILY
Qty: 60 TABLET | Refills: 3 | Status: SHIPPED | OUTPATIENT
Start: 2020-01-01 | End: 2021-02-04 | Stop reason: SDUPTHER

## 2019-12-31 RX ORDER — CLONIDINE HYDROCHLORIDE 0.2 MG/1
0.2 TABLET ORAL 2 TIMES DAILY
Qty: 60 TABLET | Refills: 3 | Status: SHIPPED | OUTPATIENT
Start: 2019-12-31 | End: 2020-01-20 | Stop reason: DRUGHIGH

## 2019-12-31 RX ADMIN — DILTIAZEM HYDROCHLORIDE 240 MG: 240 CAPSULE, EXTENDED RELEASE ORAL at 10:34

## 2019-12-31 RX ADMIN — LEVOTHYROXINE SODIUM 88 MCG: 0.09 TABLET ORAL at 06:07

## 2019-12-31 RX ADMIN — RIVAROXABAN 15 MG: 15 TABLET, FILM COATED ORAL at 10:34

## 2019-12-31 RX ADMIN — FAMOTIDINE 20 MG: 20 TABLET, FILM COATED ORAL at 10:34

## 2019-12-31 RX ADMIN — CLONIDINE HYDROCHLORIDE 0.2 MG: 0.1 TABLET ORAL at 10:34

## 2019-12-31 RX ADMIN — LOSARTAN POTASSIUM 100 MG: 100 TABLET, FILM COATED ORAL at 10:34

## 2019-12-31 RX ADMIN — FUROSEMIDE 40 MG: 40 TABLET ORAL at 10:34

## 2019-12-31 RX ADMIN — Medication 10 ML: at 10:35

## 2019-12-31 RX ADMIN — POTASSIUM CHLORIDE 20 MEQ: 20 TABLET, EXTENDED RELEASE ORAL at 10:34

## 2019-12-31 ASSESSMENT — PAIN SCALES - GENERAL
PAINLEVEL_OUTOF10: 0

## 2019-12-31 NOTE — CONSULTS
Patient's EF (Ejection Fraction) is 55%  Core Heart Failure Medications: diuretics discussed  Discharge Plans: home  Family Present: no  Advanced Directives: patient does not have advance directives and declines assistance completing them at this time    Patient has a past medical history of CKD (chronic kidney disease) stage 3, GFR 30-59 ml/min ([address] Diabetes mellitus ([address] Hyperlipidemia, Hypertension, and Thyroid disease. Comorbidities reviewed and education provided. Patient and family's stated goal of care: better understand heart failure and disease management prior to discharge. Long term goal: get scale for daily home wts    Lifestyle goal discussed: needs home scale! Patient's current functional capacity: Slight limitation of physical activity. Comfortable at rest. Ordinary physical activity results in fatigue, palpitation, dyspnea. Pt resting in bed at this time on room air. Pt denies shortness of breath. Pt without lower extremity edema. Patient's weights and intake/output reviewed:    Patient Vitals for the past 96 hrs (Last 3 readings):   Weight   12/31/19 0633 163 lb 4.8 oz (74.1 kg)   12/30/19 0547 164 lb 4.8 oz (74.5 kg)   12/29/19 0506 170 lb 4.8 oz (77.2 kg)       Intake/Output Summary (Last 24 hours) at 12/31/2019 1228  Last data filed at 12/31/2019 1047  Gross per 24 hour   Intake 880 ml   Output 2325 ml   Net -1445 ml       Patient provided with verbal and written education on CHF signs/symptoms, discharge medications, daily weights, low sodium diet, activity and follow-up. Heart Failure self-management education/written zone tool reviewed and encouraged to call at early signs of worsening CHF or when in yellow zone. Pt follows with Dr Donya Hyman at [facility] for cardiology. She was not aware she had CHF. Pt describes eating at restaurants frequently and likely consuming more than 64 oz daily fluid intake. She is not reading food labels.  She does NOT have a scale but emphasized the improtance
Supple. CHEST:  Rales and diminished breath sounds in the bases. CARDIAC:  Regular S1 and S2. There is no S3 or S4 gallop. There is a  soft systolic murmur. Jugular venous pressure is mildly elevated. ABDOMEN:  Soft, nontender. Positive bowel sounds. EXTREMITIES:  Edematous. NEUROLOGIC:  Grossly nonfocal.  SKIN:  Warm and dry. PSYCHIATRIC:  Affect calm. DIAGNOSTIC DATA:  EKG, sinus rhythm, nonspecific ST-T wave  abnormalities. Troponin is 0.03. Chest x-ray shows pleural effusion. IMPRESSION:  1. Shortness of breath likely due to mild decompensated congestive  heart failure. Also, consider anginal equivalent particularly with  exertional component. 2.  Elevated troponin maybe due to supply-demand imbalance related to  congestive heart failure. Overall, so consider underlying ischemic  heart disease. 3.  Acute-on-chronic diastolic congestive heart failure. 4.  Hypertension, suboptimally controlled. 5.  Hyperlipidemia. 6.  Diabetes. 7.  Paroxysmal atrial fibrillation. 8.  Chronic anticoagulation. 9.  Edema. RECOMMENDATIONS:  1. Diuresis. 2.  Echocardiogram.  3.  Stress test.  4.  Add aspirin, statin and clonidine.   5.  Further recommendation pending the results of her echo and stress  test.        Kristina Healy MD    D: 12/27/2019 15:38:49       T: 12/27/2019 16:25:49     [email or web address]  Job#: [id number]     Doc#: [id number]    CC:

## 2019-12-31 NOTE — PROGRESS NOTES
[facility]  Cardiology  Progress Note    Admission date:  2019    Reason for follow up visit: CHF, elevated troponin    HPI/CC: Kandis Roach is a 80 y.o. female who presented to the ED 2019 for shortness of breath. Troponin elevated. Echo shows EF 55-60%. Stress test negative for ischemia. She has been treated for CHF. Subjective:  Breathing and edema improved. No chest pain or palpitations. + lightheadedness. Does not know meds. Vitals:  Blood pressure (!) 148/77, pulse 62, temperature 97.5 °F (36.4 °C), temperature source Oral, resp. rate 17, height 5' 3\" (1.6 m), weight 163 lb 4.8 oz (74.1 kg), SpO2 93 %.   Temp  Av.6 °F (36.4 °C)  Min: 97.4 °F (36.3 °C)  Max: 97.8 °F (36.6 °C)  Pulse  Av.8  Min: 52  Max: 62  BP  Min: 114/70  Max: 149/82  SpO2  Av.2 %  Min: 92 %  Max: 94 %    24 hour I/O    Intake/Output Summary (Last 24 hours) at 2019 1256  Last data filed at 2019 1047  Gross per 24 hour   Intake 440 ml   Output 2325 ml   Net -1885 ml     Current Facility-Administered Medications   Medication Dose Route Frequency Provider Last Rate Last Dose    furosemide (LASIX) tablet 40 mg  40 mg Oral Daily Jose Bazzi APRN - CNP   40 mg at 19 1034    metoprolol succinate (TOPROL XL) extended release tablet 150 mg  150 mg Oral Daily VIKTORIYA Vera - CNP   Stopped at 19 1035    potassium chloride (KLOR-CON M) extended release tablet 20 mEq  20 mEq Oral BID Zion Raymond MD   20 mEq at 19 1034    aspirin EC tablet 81 mg  81 mg Oral Once Nani Barnhart MD        atorvastatin (LIPITOR) tablet 40 mg  40 mg Oral Nightly Nani Barnhart MD   40 mg at 19 2114    cloNIDine (CATAPRES) tablet 0.2 mg  0.2 mg Oral BID Nani Barnhart MD   0.2 mg at 19 1034    melatonin tablet 5 mg  5 mg Oral Nightly PRN Dalia Espino MD   5 mg at 19    diltiazem (CARDIZEM CD) extended release capsule 240 mg  240 mg Oral Daily affect    Data Reviewed:    Stress test 12/28/2019:  Normal myocardial perfusion study with normal left ventricular function,  size, and wall motion. The estimated left ventricular function is 79%. Echo 12/28/2019:  Normal left ventricular systolic function with ejection fraction of 55-60%. No regional wall motion abnormalites are seen. Left ventricular diastolic filling pressure is normal.   Compared to previous study from 4- no changes noted in left   ventricular function. Mild mitral and pulmonic regurgitation. Systolic pulmonic artery pressure (SPAP) is normal estimated at 19 mmHg   (Right atrial pressure of 3 mmHg).     Lab Reviewed:     Renal Profile:  Lab Results   Component Value Date    CREATININE 1.2 12/31/2019    BUN 28 12/31/2019     12/31/2019    K 4.0 12/31/2019    K 3.6 12/29/2019    CL 95 12/31/2019    CO2 30 12/31/2019     CBC:    Lab Results   Component Value Date    WBC 4.8 12/27/2019    RBC 4.53 12/27/2019    HGB 12.1 12/27/2019    HCT 36.0 12/27/2019    MCV 79.4 12/27/2019    RDW 16.0 12/27/2019     12/27/2019     BNP:    Lab Results   Component Value Date    PROBNP 727 12/28/2019    PROBNP 1,293 12/26/2019     Fasting Lipid Panel:    Lab Results   Component Value Date    CHOL 146 12/28/2019    HDL 57 12/28/2019    TRIG 125 12/28/2019     Cardiac Enzymes:  CK/MbTroponin  Lab Results   Component Value Date    TROPONINI <0.01 12/27/2019     PT/ INR   Lab Results   Component Value Date    INR 3.69 12/26/2019    INR 1.29 05/12/2015    INR 1.76 05/06/2015    PROTIME 43.4 12/26/2019    PROTIME 14.1 05/12/2015    PROTIME 19.6 05/06/2015     PTT No results found for: PTT   Lab Results   Component Value Date    MG 1.70 12/30/2019    No results found for: TSH    All labs and imaging reviewed today    Assessment:  Elevated troponin: not ACS, likely due to supply-demand imbalance with CHF, stress test negative 12/28/19  Acute on chronic diastolic CHF: improved, -9.8 L, -30 lbs?  Bradycardia: stable, Toprol held this am, + lightheadedness   Paroxysmal atrial fibrillation: in sinus   - [id number] score >2 on xarelto   - historically asymptomatic  HTN: sub optimal  HLD: controlled, LDL 64  CKD: follows with Dr. Angelita Yin  DM    Plan:   1. Continue Lasix po - 40 mg daily which an increase in prior home dose  2. Decrease metoprolol dose to 100 mg daily given bradycardia  3. Okay for discharge from cardiac perspective. Will review cardiac medications on discharge medication reconciliation form. Will ask office to call and arrange follow up appointment with [facility] in 2 weeks.       Hamilton Young, VIKTORIYA - CNP, 12/31/2019, 12:59 PM   \[facility]\"" 81  [phone]

## 2019-12-31 NOTE — PROGRESS NOTES
Assessment completed and documented. VSS. A/ox4. Denies pain. Ambulates independently. Denies further needs at this time. Bed locked and in lowest position. Bedside table and call light within reach. Will continue to monitor.

## 2019-12-31 NOTE — CARE COORDINATION
ADIN noted DC plan on the pt chart. SW also noted that Pt was Denying needs. Adin spoke with Alma Rosa Vigil RN and asked if she felt the pt had DCP needs. She said no the Pt is a \"selfer\". Please consult CM if needs arise.  Eron To, LSW

## 2019-12-31 NOTE — DISCHARGE SUMMARY
stopped felodipine since she is on dilt.     Trop elevation  - likely demand ischemia due to above issues.  TTE as above.  No further ischemia workup anticipated.      Afib  - rivaroxaban     Hypothyroidism  - Clinically euthyroid.  Continue home dose of levothyroxine.  TSH was normal last month with endocrinology clinic.     DM2  - recent A1c was 7, she follows with endocrinology.  Could consider metformin, defer to outpatient management.      HLD  - statin          Physical Exam Performed:     BP (!) 149/82   Pulse 56   Temp 97.4 °F (36.3 °C) (Oral)   Resp 14   Ht 5' 3\" (1.6 m)   Wt 163 lb 4.8 oz (74.1 kg)   SpO2 94%   BMI 28.93 kg/m²       General appearance:  No apparent distress, appears stated age and cooperative. HEENT:  Normal cephalic, atraumatic without obvious deformity. Pupils equal, round, and reactive to light. Extra ocular muscles intact. Conjunctivae/corneas clear. Neck: Supple, with full range of motion. No jugular venous distention. Trachea midline. Respiratory:  Normal respiratory effort. Clear to auscultation, bilaterally without Rales/Wheezes/Rhonchi. Cardiovascular:  Regular rate and rhythm with normal S1/S2 without murmurs, rubs or gallops. Abdomen: Soft, non-tender, non-distended with normal bowel sounds. Musculoskeletal:  No clubbing, cyanosis or edema bilaterally. Full range of motion without deformity. Skin: Skin color, texture, turgor normal.  No rashes or lesions. Neurologic:  Neurovascularly intact without any focal sensory/motor deficits. Cranial nerves: II-XII intact, grossly non-focal.  Psychiatric:  Alert and oriented, thought content appropriate, normal insight  Capillary Refill: Brisk,< 3 seconds   Peripheral Pulses: +2 palpable, equal bilaterally       Labs:  For convenience and continuity at follow-up the following most recent labs are provided:      CBC:    Lab Results   Component Value Date    WBC 4.8 12/27/2019    HGB 12.1 12/27/2019    HCT 36.0 12/27/2019  12/27/2019       Renal:    Lab Results   Component Value Date     12/31/2019    K 4.0 12/31/2019    K 3.6 12/29/2019    CL 95 12/31/2019    CO2 30 12/31/2019    BUN 28 12/31/2019    CREATININE 1.2 12/31/2019    CALCIUM 9.8 12/31/2019    PHOS 3.2 12/31/2019         Significant Diagnostic Studies    Radiology:   NM Cardiac Stress Test Nuclear Imaging   Final Result      XR CHEST STANDARD (2 VW)   Final Result   Small bilateral subpulmonic pleural effusions. No definite infiltrate                Consults:     IP CONSULT TO HOSPITALIST  IP CONSULT TO HEART FAILURE NURSE/COORDINATOR  IP CONSULT TO CARDIOLOGY    Disposition:  Home     Condition at Discharge: Stable    Discharge Instructions/Follow-up:  Cardiology as directed    Code Status:  Full Code     Activity: activity as tolerated    Diet: cardiac diet and diabetic diet      Discharge Medications:     Current Discharge Medication List           Details   aspirin 81 MG EC tablet Take 1 tablet by mouth once for 1 dose  Qty: 30 tablet, Refills: 3      cloNIDine (CATAPRES) 0.2 MG tablet Take 1 tablet by mouth 2 times daily  Qty: 60 tablet, Refills: 3              Details   losartan (COZAAR) 100 MG tablet Take 1 tablet by mouth daily  Qty: 30 tablet, Refills: 3      metoprolol succinate (TOPROL XL) 50 MG extended release tablet Take 3 tablets by mouth daily  Qty: 30 tablet, Refills: 3      furosemide (LASIX) 40 MG tablet Take 1 tablet by mouth daily  Qty: 60 tablet, Refills: 3              Details   fenofibrate (TRICOR) 54 MG tablet Take 54 mg by mouth daily. s [facility] pharmacy: Please dispense generic fenofibrate unless prescriber denote      diltiazem (CARDIZEM CD) 240 MG extended release capsule Take 1 capsule by mouth daily  Qty: 90 capsule, Refills: 3      rivaroxaban (XARELTO) 15 MG TABS tablet Take 1 tablet by mouth daily (with breakfast)  Qty: 30 tablet, Refills: 5      pravastatin (PRAVACHOL) 20 MG tablet Take 20 mg by mouth daily. levothyroxine (SYNTHROID) 75 MCG tablet Take 88 mcg by mouth Daily       VITAMIN D-3 (COLECALCIFEROL) 400 UNITS TABS Take by mouth daily. Docusate Calcium (STOOL SOFTENER PO) Take 1 tablet by mouth daily. famotidine (PEPCID) 20 MG tablet Take 20 mg by mouth daily. Time Spent on discharge is more than 30 minutes in the examination, evaluation, counseling and review of medications and discharge plan. Signed:    Flakita Bro MD   12/31/2019      Thank you Reather Lesches, MD for the opportunity to be involved in this patient's care. If you have any questions or concerns please feel free to contact me at [phone].

## 2020-01-01 NOTE — PROGRESS NOTES
Discharge instructions and medications reviewed with patient and family at bedside. IV and Tele discontinued, patient has no questions at this time. New medication  prescription instructions explained in full. All belongings accounted for. Discharge packet copy sheet signed and placed in chart. Pt wheeled out front lobby by family members. [address] notified.  Electronically signed by Checo Vasques RN on 12/31/2019 at 7:50 PM

## 2020-01-02 ENCOUNTER — FOLLOWUP TELEPHONE ENCOUNTER (OUTPATIENT)
Dept: ADMINISTRATIVE | Age: 85
End: 2020-01-02

## 2020-01-03 ENCOUNTER — TELEPHONE (OUTPATIENT)
Dept: CARDIOLOGY CLINIC | Age: 85
End: 2020-01-03

## 2020-01-03 NOTE — TELEPHONE ENCOUNTER
Pt informed and she said pharm called her and said med is ready. I informed pt that she can call around to diff pharms and see if they have med in stock.

## 2020-01-20 ENCOUNTER — OFFICE VISIT (OUTPATIENT)
Dept: CARDIOLOGY CLINIC | Age: 85
End: 2020-01-20
Payer: MEDICARE

## 2020-01-20 VITALS
HEIGHT: 63 IN | WEIGHT: 160 LBS | BODY MASS INDEX: 28.35 KG/M2 | OXYGEN SATURATION: 95 % | HEART RATE: 80 BPM | DIASTOLIC BLOOD PRESSURE: 56 MMHG | SYSTOLIC BLOOD PRESSURE: 150 MMHG

## 2020-01-20 PROCEDURE — 99214 OFFICE O/P EST MOD 30 MIN: CPT | Performed by: INTERNAL MEDICINE

## 2020-01-20 RX ORDER — CLONIDINE HYDROCHLORIDE 0.2 MG/1
0.1 TABLET ORAL 2 TIMES DAILY
Qty: 30 TABLET | Refills: 5
Start: 2020-01-20 | End: 2020-01-20

## 2020-01-20 RX ORDER — CLONIDINE HYDROCHLORIDE 0.2 MG/1
0.2 TABLET ORAL 2 TIMES DAILY
Qty: 60 TABLET | Refills: 5
Start: 2020-01-20 | End: 2020-04-27 | Stop reason: SDUPTHER

## 2020-01-20 RX ORDER — CLONIDINE HYDROCHLORIDE 0.2 MG/1
0.2 TABLET ORAL 2 TIMES DAILY
Qty: 30 TABLET | Refills: 5 | Status: SHIPPED
Start: 2020-01-20 | End: 2020-01-20

## 2020-01-27 ENCOUNTER — HOSPITAL ENCOUNTER (OUTPATIENT)
Age: 85
Discharge: HOME | End: 2020-01-27
Payer: MEDICARE

## 2020-01-27 LAB
ANION GAP SERPL CALCULATED.3IONS-SCNC: 17 MMOL/L (ref 3–16)
BASOPHILS ABSOLUTE: 0 K/UL (ref 0–0.2)
BASOPHILS RELATIVE PERCENT: 0.5 %
BUN BLDV-MCNC: 30 MG/DL (ref 7–20)
CALCIUM SERPL-MCNC: 10 MG/DL (ref 8.3–10.6)
CHLORIDE BLD-SCNC: 100 MMOL/L (ref 99–110)
CHOLESTEROL, FASTING: 161 MG/DL (ref 0–199)
CO2: 23 MMOL/L (ref 21–32)
CREAT SERPL-MCNC: 1.3 MG/DL (ref 0.6–1.2)
EOSINOPHILS ABSOLUTE: 0.1 K/UL (ref 0–0.6)
EOSINOPHILS RELATIVE PERCENT: 2.4 %
GFR AFRICAN AMERICAN: 47
GFR NON-AFRICAN AMERICAN: 39
GLUCOSE BLD-MCNC: 191 MG/DL (ref 70–99)
HCT VFR BLD CALC: 39.6 % (ref 36–48)
HDLC SERPL-MCNC: 47 MG/DL (ref 40–60)
HEMOGLOBIN: 13 G/DL (ref 12–16)
LDL CHOLESTEROL CALCULATED: 88 MG/DL
LYMPHOCYTES ABSOLUTE: 1.2 K/UL (ref 1–5.1)
LYMPHOCYTES RELATIVE PERCENT: 19.8 %
MCH RBC QN AUTO: 26.5 PG (ref 26–34)
MCHC RBC AUTO-ENTMCNC: 32.8 G/DL (ref 31–36)
MCV RBC AUTO: 80.9 FL (ref 80–100)
MONOCYTES ABSOLUTE: 0.3 K/UL (ref 0–1.3)
MONOCYTES RELATIVE PERCENT: 5.4 %
NEUTROPHILS ABSOLUTE: 4.4 K/UL (ref 1.7–7.7)
NEUTROPHILS RELATIVE PERCENT: 71.9 %
PDW BLD-RTO: 16.4 % (ref 12.4–15.4)
PLATELET # BLD: 148 K/UL (ref 135–450)
PMV BLD AUTO: 9 FL (ref 5–10.5)
POTASSIUM SERPL-SCNC: 4.8 MMOL/L (ref 3.5–5.1)
RBC # BLD: 4.9 M/UL (ref 4–5.2)
SODIUM BLD-SCNC: 140 MMOL/L (ref 136–145)
TRIGLYCERIDE, FASTING: 128 MG/DL (ref 0–150)
VLDLC SERPL CALC-MCNC: 26 MG/DL
WBC # BLD: 6.2 K/UL (ref 4–11)

## 2020-01-27 PROCEDURE — 80061 LIPID PANEL: CPT

## 2020-01-27 PROCEDURE — 36415 COLL VENOUS BLD VENIPUNCTURE: CPT

## 2020-01-27 PROCEDURE — 85025 COMPLETE CBC W/AUTO DIFF WBC: CPT

## 2020-01-27 PROCEDURE — 80048 BASIC METABOLIC PNL TOTAL CA: CPT

## 2020-03-11 ENCOUNTER — TELEPHONE (OUTPATIENT)
Dept: CARDIOLOGY CLINIC | Age: 85
End: 2020-03-11

## 2020-04-27 RX ORDER — LOSARTAN POTASSIUM 100 MG/1
100 TABLET ORAL DAILY
Qty: 90 TABLET | Refills: 1 | Status: SHIPPED | OUTPATIENT
Start: 2020-04-27

## 2020-04-27 RX ORDER — LOSARTAN POTASSIUM 100 MG/1
100 TABLET ORAL DAILY
Qty: 30 TABLET | Refills: 3 | Status: SHIPPED | OUTPATIENT
Start: 2020-04-27 | End: 2020-04-27 | Stop reason: SDUPTHER

## 2020-04-27 RX ORDER — CLONIDINE HYDROCHLORIDE 0.2 MG/1
0.2 TABLET ORAL 2 TIMES DAILY
Qty: 60 TABLET | Refills: 5 | Status: SHIPPED | OUTPATIENT
Start: 2020-04-27 | End: 2020-04-27 | Stop reason: SDUPTHER

## 2020-04-27 RX ORDER — CLONIDINE HYDROCHLORIDE 0.2 MG/1
0.2 TABLET ORAL 2 TIMES DAILY
Qty: 180 TABLET | Refills: 1 | Status: SHIPPED | OUTPATIENT
Start: 2020-04-27 | End: 2020-08-05 | Stop reason: SDUPTHER

## 2020-05-05 RX ORDER — METOPROLOL SUCCINATE 100 MG/1
TABLET, EXTENDED RELEASE ORAL
Qty: 90 TABLET | Refills: 2 | Status: SHIPPED | OUTPATIENT
Start: 2020-05-05

## 2020-06-02 RX ORDER — RIVAROXABAN 15 MG/1
TABLET, FILM COATED ORAL
Qty: 90 TABLET | Refills: 2 | Status: SHIPPED | OUTPATIENT
Start: 2020-06-02 | End: 2021-01-29

## 2020-08-04 NOTE — PROGRESS NOTES
Navi Kurtz which was cancelled due to Covid pandemic. Past Medical History:   has a past medical history of CKD (chronic kidney disease) stage 3, GFR 30-59 ml/min ([facility] [address] Diabetes mellitus ([facility] [address] Hyperlipidemia, Hypertension, and Thyroid disease. Surgical History:   has a past surgical history that includes Dilation and curettage of uterus; Tubal ligation; Bladder surgery; Skin cancer excision;  section; Colonoscopy (2007); ERCP (2015); and Cholecystectomy (05/12/15). Social History:   reports that she has never smoked. She has never used smokeless tobacco. She reports current alcohol use. She reports that she does not use drugs. Family History:  family history includes Cancer in her brother and sister. Home Medications:  Prior to Admission medications    Medication Sig Start Date End Date Taking? Authorizing Provider   XARELTO 15 MG TABS tablet TAKE ONE TABLET BY MOUTH DAILY WITH BREAKFAST 20  Yes Penny Caldera MD   metoprolol succinate (TOPROL XL) 100 MG extended release tablet TAKE ONE TABLET BY MOUTH DAILY 20  Yes Shonna Moore MD   cloNIDine (CATAPRES) 0.2 MG tablet Take 1 tablet by mouth 2 times daily 20  Yes Penny Caldera MD   losartan (COZAAR) 100 MG tablet Take 1 tablet by mouth daily 20  Yes Penny Caldera MD   furosemide (LASIX) 40 MG tablet Take 1 tablet by mouth daily  Patient taking differently: Take 40 mg by mouth daily as needed  20  Yes Jaqueline Frederick MD   diltiazem (CARDIZEM CD) 240 MG extended release capsule Take 1 capsule by mouth daily 9/10/19 9/6/20 Yes Penny Caldera MD   pravastatin (PRAVACHOL) 20 MG tablet Take 20 mg by mouth daily. Yes Historical Provider, MD   fenofibrate (TRICOR) 54 MG tablet Take 54 mg by mouth daily.  s [facility] pharmacy: Please dispense generic fenofibrate unless prescriber denote   Yes Historical Provider, MD   levothyroxine (SYNTHROID) 75 MCG tablet Take 88 mcg by mouth Daily    Yes Historical Provider, MD   VITAMIN D-3 (COLECALCIFEROL) 400 UNITS TABS Take by mouth daily. Yes Historical Provider, MD   Docusate Calcium (STOOL SOFTENER PO) Take 1 tablet by mouth daily. Yes Historical Provider, MD   famotidine (PEPCID) 20 MG tablet Take 20 mg by mouth daily. Yes Historical Provider, MD      Allergies:  Aspirin     Review of Systems:   · Constitutional: there has been no unanticipated weight loss. There's been no change in energy level, sleep pattern, or activity level. · Eyes: No visual changes or diplopia. No scleral icterus. · ENT: No Headaches, hearing loss or vertigo. No mouth sores or sore throat. · Cardiovascular: Reviewed in HPI  · Respiratory: No cough or wheezing, no sputum production. No hematemesis. · Gastrointestinal: No abdominal pain, appetite loss, blood in stools. No change in bowel or bladder habits. · Genitourinary: No dysuria, trouble voiding, or hematuria. · Musculoskeletal:  No gait disturbance, weakness or joint complaints. · Integumentary: No rash or pruritis. · Neurological: No headache, diplopia, change in muscle strength, numbness or tingling. No change in gait, balance, coordination, mood, affect, memory, mentation, behavior. · Psychiatric: No anxiety, no depression. · Endocrine: No malaise, fatigue or temperature intolerance. No excessive thirst, fluid intake, or urination. No tremor. · Hematologic/Lymphatic: No abnormal bruising or bleeding, blood clots or swollen lymph nodes. · Allergic/Immunologic: No nasal congestion or hives.     Physical Examination:    Vitals:    8/5/20   BP: 150/76     My retake 170/80 in both arms   Pulse: 76   SpO2: 97%        Constitutional and General Appearance: NAD   Respiratory:  · Normal excursion and expansion without use of accessory muscles  · Resp Auscultation: Normal breath sounds without dullness  Cardiovascular:  · The apical impulses not displaced  · Heart tones are crisp and normal  · Cervical veins are not engorged  · The carotid upstroke is normal in amplitude and contour without delay or bruit  · Normal S1S2, No S3, no murmur   · Peripheral pulses are symmetrical and full  · There is no clubbing, cyanosis of the extremities. · Trace BLE Edema   · Femoral Arteries: 2+ and equal  · Pedal Pulses: 2+ and equal   Abdomen:  · No masses or tenderness  · Liver/Spleen: No Abnormalities Noted  Neurological/Psychiatric:  · Alert and oriented in all spheres  · Moves all extremities well  · Exhibits normal gait balance and coordination  · No abnormalities of mood, affect, memory, mentation, or behavior are noted    Lab Results   Component Value Date    CHOL 146 12/28/2019    CHOL 189 09/26/2017    CHOL 200 02/19/2016     Lab Results   Component Value Date    TRIG 125 12/28/2019    TRIG 179 (H) 09/26/2017    TRIG 123 02/19/2016     Lab Results   Component Value Date    HDL 47 01/27/2020    HDL 57 12/28/2019    HDL 48 (L) 09/26/2017     Lab Results   Component Value Date    LDLCALC 88 01/27/2020    LDLCALC 64 12/28/2019    LDLCALC 105 09/26/2017     Lab Results   Component Value Date    LABVLDL 26 01/27/2020    LABVLDL 25 12/28/2019    LABVLDL 20 04/21/2015     Lab Results   Component Value Date    CHOLHDLRATIO 3.9 09/26/2017       LABS: 1/17/2020 ([facility])  , K 4.5, BUN/Cr=57/2.04, AST 22, ALT 15    Labs: 06/15/20 ([facility]) , K 4.4, BUN 20, CREAT 1.05, , , HDL 50, LDL 96, AST 12, ALT 10     Assessment:      1. Atrial Fibrillation:  Paroxysmal afib with CHADS score=5. Due to CRI I will continue lower dose xarelto 15mg daily for anticoagulation. Note that she is not able to feel when she was in AFib. Today she remains NSR on auscultation. Prior saw Dr. Blade York for nephrology but now sees Dr. Osman Brown. 2. Hypertension: Suboptimal blood pressure control and will need adjustment of antihypertensive medical regimen. Will increase clonidine to 0.2mg TID from BID regimen.  She sees Dr. Osman Brown and I will

## 2020-08-05 ENCOUNTER — OFFICE VISIT (OUTPATIENT)
Dept: CARDIOLOGY CLINIC | Age: 85
End: 2020-08-05
Payer: MEDICARE

## 2020-08-05 VITALS
WEIGHT: 165 LBS | HEIGHT: 63 IN | DIASTOLIC BLOOD PRESSURE: 80 MMHG | HEART RATE: 59 BPM | SYSTOLIC BLOOD PRESSURE: 170 MMHG | BODY MASS INDEX: 29.23 KG/M2 | OXYGEN SATURATION: 97 %

## 2020-08-05 PROCEDURE — 99214 OFFICE O/P EST MOD 30 MIN: CPT | Performed by: INTERNAL MEDICINE

## 2020-08-05 RX ORDER — CLONIDINE HYDROCHLORIDE 0.2 MG/1
0.2 TABLET ORAL 3 TIMES DAILY
Qty: 90 TABLET | Refills: 5 | Status: SHIPPED | OUTPATIENT
Start: 2020-08-05 | End: 2020-08-11

## 2020-08-05 NOTE — LETTER
[address]   Cardiac Followup    Referring Provider:  Mabel Mccallum MD     Chief Complaint   Patient presents with    Congestive Heart Failure    Hypertension    Atrial Fibrillation    Edema     improving      Subjective: Ms Cathy Elliott presents to day for cardiology follow up of HTN, HLD, PAF, and diastolic CHF; c/o dizziness today    History of Present Illness:     Ms. Cathy Elliott is an 80 y.o. female with history of HTN, HLD, PAF on low dose xarelto, diastolic CHF dx 74/47, CRI, diet controlled DM, and thyroid disease. Admitted on 4/21/15 with gallstone pancreatitis. She developed afib during admit and was asymptomatic. She converted to NSR on IV Cardizem. Note EKG 5/6/15 showed NSR, inferior wall infarct and nonspecific ST changes. She returned back to the ER on 05/06/2015 with recurrent acute pancreatitis. She underwent ERCP on 05/11/2015 with cholecystectomy on 05/12/2015. She had PAF during hospitalization and note that she did again NOT feel symptoms with the arrhythmia. Admitted 12/26/19 with c/o SOB and 10 lb weight gain. Diagnosed with acute diastolic CHF and diuresed with IV lasix. Most recent [facility] Other was obtained 12/26/19 negative for ischemia. Most recent ECHO 12/26/19 EF 55-60% (no change from 4/15 study). Most recent EKG 12/26/19 showed SR, 1st degree AV block, non specific ST change. Note acute on CRI with lab 1/17/20 BUN/Cr=57/2.04) and held lasix. Today she reports she occasionally checks BP at home and is around 130s/80s. Home weights have been 157-162 lbs. Today's weight in [id number] She sees nephrologist, Dr Elmo Spurling, on 08/21/20 (prior saw Dr. Angel Eaton who moved to [facility]). She reports only using lasix about 2 times since last OV for occasional BLE edema. She reports occasional lightheadedness and it's random. Patient with no complaints of chest pain, SOB, palpitations, or orthopnea/PND.  She is to have a procedure (probably cystoscopy) for hematuria with Dr Rohan Glez which was cancelled due to Covid pandemic. Past Medical History:   has a past medical history of CKD (chronic kidney disease) stage 3, GFR 30-59 ml/min ([facility] [address] Diabetes mellitus ([facility] [address] Hyperlipidemia, Hypertension, and Thyroid disease. Surgical History:   has a past surgical history that includes Dilation and curettage of uterus; Tubal ligation; Bladder surgery; Skin cancer excision;  section; Colonoscopy (2007); ERCP (2015); and Cholecystectomy (05/12/15). Social History:   reports that she has never smoked. She has never used smokeless tobacco. She reports current alcohol use. She reports that she does not use drugs. Family History:  family history includes Cancer in her brother and sister. Home Medications:  Prior to Admission medications    Medication Sig Start Date End Date Taking? Authorizing Provider   XARELTO 15 MG TABS tablet TAKE ONE TABLET BY MOUTH DAILY WITH BREAKFAST 20  Yes Claude Goldberg MD   metoprolol succinate (TOPROL XL) 100 MG extended release tablet TAKE ONE TABLET BY MOUTH DAILY 20  Yes James Cao MD   cloNIDine (CATAPRES) 0.2 MG tablet Take 1 tablet by mouth 2 times daily 20  Yes Claude Goldberg MD   losartan (COZAAR) 100 MG tablet Take 1 tablet by mouth daily 20  Yes Claude Goldberg MD   furosemide (LASIX) 40 MG tablet Take 1 tablet by mouth daily  Patient taking differently: Take 40 mg by mouth daily as needed  20  Yes Gaviota Rosales MD   diltiazem (CARDIZEM CD) 240 MG extended release capsule Take 1 capsule by mouth daily 9/10/19 9/6/20 Yes Claude Goldberg MD   pravastatin (PRAVACHOL) 20 MG tablet Take 20 mg by mouth daily. Yes Historical Provider, MD   fenofibrate (TRICOR) 54 MG tablet Take 54 mg by mouth daily.  [facility] pharmacy: Please dispense generic fenofibrate unless prescriber denote   Yes Historical Provider, MD levothyroxine (SYNTHROID) 75 MCG tablet Take 88 mcg by mouth Daily    Yes Historical Provider, MD   VITAMIN D-3 (COLECALCIFEROL) 400 UNITS TABS Take by mouth daily. Yes Historical Provider, MD   Docusate Calcium (STOOL SOFTENER PO) Take 1 tablet by mouth daily. Yes Historical Provider, MD   famotidine (PEPCID) 20 MG tablet Take 20 mg by mouth daily. Yes Historical Provider, MD      Allergies:  Aspirin     Review of Systems:   · Constitutional: there has been no unanticipated weight loss. There's been no change in energy level, sleep pattern, or activity level. · Eyes: No visual changes or diplopia. No scleral icterus. · ENT: No Headaches, hearing loss or vertigo. No mouth sores or sore throat. · Cardiovascular: Reviewed in HPI  · Respiratory: No cough or wheezing, no sputum production. No hematemesis. · Gastrointestinal: No abdominal pain, appetite loss, blood in stools. No change in bowel or bladder habits. · Genitourinary: No dysuria, trouble voiding, or hematuria. · Musculoskeletal:  No gait disturbance, weakness or joint complaints. · Integumentary: No rash or pruritis. · Neurological: No headache, diplopia, change in muscle strength, numbness or tingling. No change in gait, balance, coordination, mood, affect, memory, mentation, behavior. · Psychiatric: No anxiety, no depression. · Endocrine: No malaise, fatigue or temperature intolerance. No excessive thirst, fluid intake, or urination. No tremor. · Hematologic/Lymphatic: No abnormal bruising or bleeding, blood clots or swollen lymph nodes. · Allergic/Immunologic: No nasal congestion or hives.     Physical Examination:    Vitals:    8/5/20   BP: 150/76     My retake 170/80 in both arms   Pulse: 76   SpO2: 97%        Constitutional and General Appearance: NAD   Respiratory:  · Normal excursion and expansion without use of accessory muscles  · Resp Auscultation: Normal breath sounds without dullness  Cardiovascular: · The apical impulses not displaced  · Heart tones are crisp and normal  · Cervical veins are not engorged  · The carotid upstroke is normal in amplitude and contour without delay or bruit  · Normal S1S2, No S3, no murmur   · Peripheral pulses are symmetrical and full  · There is no clubbing, cyanosis of the extremities. · Trace BLE Edema   · Femoral Arteries: 2+ and equal  · Pedal Pulses: 2+ and equal   Abdomen:  · No masses or tenderness  · Liver/Spleen: No Abnormalities Noted  Neurological/Psychiatric:  · Alert and oriented in all spheres  · Moves all extremities well  · Exhibits normal gait balance and coordination  · No abnormalities of mood, affect, memory, mentation, or behavior are noted    Lab Results   Component Value Date    CHOL 146 12/28/2019    CHOL 189 09/26/2017    CHOL 200 02/19/2016     Lab Results   Component Value Date    TRIG 125 12/28/2019    TRIG 179 (H) 09/26/2017    TRIG 123 02/19/2016     Lab Results   Component Value Date    HDL 47 01/27/2020    HDL 57 12/28/2019    HDL 48 (L) 09/26/2017     Lab Results   Component Value Date    LDLCALC 88 01/27/2020    LDLCALC 64 12/28/2019    LDLCALC 105 09/26/2017     Lab Results   Component Value Date    LABVLDL 26 01/27/2020    LABVLDL 25 12/28/2019    LABVLDL 20 04/21/2015     Lab Results   Component Value Date    CHOLHDLRATIO 3.9 09/26/2017       LABS: 1/17/2020 ([facility])  , K 4.5, BUN/Cr=57/2.04, AST 22, ALT 15    Labs: 06/15/20 ([facility]) , K 4.4, BUN 20, CREAT 1.05, , , HDL 50, LDL 96, AST 12, ALT 10     Assessment:      1. Atrial Fibrillation:  Paroxysmal afib with CHADS score=5. Due to CRI I will continue lower dose xarelto 15mg daily for anticoagulation. Note that she is not able to feel when she was in AFib. Today she remains NSR on auscultation. Prior saw Dr. Izabela Mayfield for nephrology but now sees Dr. Lorella Alpers.     2. Hypertension: Suboptimal blood pressure control and will need adjustment of antihypertensive medical regimen. Will increase clonidine to 0.2mg TID from BID regimen. She sees Dr. Loree Aguilar and I will try to call him. Maximized on multiple BP meds and CRI and may need w/u for JAKOB. I will d/w renal doc. 3. DM II:  Per PCP    4. Hypothyroidism: Per PCP      5. Edema:  None noted. 6. Acute diastolic CHF: Diagnosed 86/77 during admission. Likely from long-standing HTN. She was diuresed about 30# (190 down to 160#). Note recent prerenal azotemia  (1/17/20 BUN/Cr=57/2.04 with BUN/Cr=20/1.05 6/15/20). I told her to use only PRN for weight 165# or >, increased swelling, and/or SOB. Instructed her on fluid and salt restriction also. Plan:  1. Fluid restriction 64 ounces daily  2. Sodium restriction diet  3. Increase clonidine to 0.2 mg three times daily  4. Follow up in 3 months with NP for BP check  5. Follow up in 6 months with me    Cost of prescription medications and patient compliance have been reviewed with patient. All questions answered. Thank you for allowing me to participate in the care of this individual.    Scribe's attestation: This note was scribed in the presence of Dr Lesly Shen by Patricia Parr.  Renaye Osler, M.D., [facility] [place]

## 2020-08-05 NOTE — PATIENT INSTRUCTIONS
Plan:  1. Fluid restriction 64 ounces daily  2. Sodium restriction diet  3. Increase clonidine to 0.2 mg three times daily  4. Follow up in 3 months with NP for BP check  5.  Follow up in 6 months with me

## 2020-08-10 ENCOUNTER — TELEPHONE (OUTPATIENT)
Dept: CARDIOLOGY CLINIC | Age: 85
End: 2020-08-10

## 2020-08-10 NOTE — TELEPHONE ENCOUNTER
Pt would like [facility] to call in rx for her Clonidine 0.3 mgs so pt will only have to take 1 tab in am and one 1 tab at pm.   When pt was taking 3 tabs per day of the 0.2mgs, it was keeping her extremely tired all through the day. She is hoping if she take 1 tab of the 0.3mgs in the morning, and not another tab of the 0.3mgs until she goes to bed, she would be able to sleep off the tiredness.   Pt's pharmacy in [facility] [phone]

## 2020-08-11 RX ORDER — CLONIDINE HYDROCHLORIDE 0.3 MG/1
0.3 TABLET ORAL 2 TIMES DAILY
Qty: 60 TABLET | Refills: 6 | Status: SHIPPED
Start: 2020-08-11 | End: 2021-02-04 | Stop reason: CLARIF

## 2020-08-11 NOTE — TELEPHONE ENCOUNTER
[place] she is seeing Dr. Lorella Alpers 8/21/20 and having renal US with labs. We'll see how clonidine 0.3mg BID goes for her.

## 2020-08-11 NOTE — TELEPHONE ENCOUNTER
OK to try taking clonidine 0.3mg po BID instead of 0.2mg TID to see if helps. She needs to make appt to see Dr. Meredith Bender, renal doc. Does she have one? I spoke to him about her after the OV.

## 2020-08-11 NOTE — TELEPHONE ENCOUNTER
Patient notified  , message given.  Patient verbalized understanding Verdis Mortimer, RN    Yes she does see Dr Keya Fang has appt on 8/21/20 he is having her get a renal US and labs prior to visit   RX sent in for new change on Clonidine

## 2020-08-21 ENCOUNTER — HOSPITAL ENCOUNTER (OUTPATIENT)
Age: 85
Discharge: HOME | End: 2020-08-21
Payer: MEDICARE

## 2020-08-21 PROCEDURE — 82088 ASSAY OF ALDOSTERONE: CPT

## 2020-08-21 PROCEDURE — 84244 ASSAY OF RENIN: CPT

## 2020-08-23 LAB
ALDOSTERONE: 9.9 NG/DL
RENIN ACTIVITY: 0.3 NG/ML/HR

## 2020-09-28 RX ORDER — DILTIAZEM HYDROCHLORIDE 240 MG/1
CAPSULE, EXTENDED RELEASE ORAL
Qty: 90 CAPSULE | Refills: 2 | Status: SHIPPED | OUTPATIENT
Start: 2020-09-28 | End: 2021-02-04 | Stop reason: SDUPTHER

## 2020-11-17 ENCOUNTER — TELEPHONE (OUTPATIENT)
Dept: CARDIOLOGY CLINIC | Age: 85
End: 2020-11-17

## 2020-11-17 NOTE — TELEPHONE ENCOUNTER
Pt calling for cardiac clearance.  Paper work was faxed to Dr Sunni Wooten to fill out for the clearance request. MICHAEL

## 2020-11-24 ENCOUNTER — TELEPHONE (OUTPATIENT)
Dept: CARDIOLOGY CLINIC | Age: 85
End: 2020-11-24

## 2020-11-24 NOTE — TELEPHONE ENCOUNTER
Intermediate risk clinically for lower risk procedure. OK to hold xarelto for least amount of time possible and proceed as planned. Thanks.

## 2021-01-29 RX ORDER — RIVAROXABAN 15 MG/1
TABLET, FILM COATED ORAL
Qty: 90 TABLET | Refills: 1 | Status: SHIPPED | OUTPATIENT
Start: 2021-01-29 | End: 2021-02-04 | Stop reason: SDUPTHER

## 2021-02-03 NOTE — PROGRESS NOTES
[facility]   Cardiac Followup    Referring Provider:  Johanna Dumont MD     Chief Complaint   Patient presents with    6 Month Follow-Up      Subjective: Ms Ramakrishna Peguero presents to [facility] for cardiology follow up of HTN, HLD, PAF, and diastolic CHF; no complaints today    History of Present Illness:     Ms. Ramakrishna Peguero is an 80 y.o. female with history of HTN, HLD, PAF on low dose xarelto (CrCl <05AK/ZEH), diastolic CHF dx 17/86, CRI, diet controlled DM, and thyroid disease. Admitted on 4/21/15 with gallstone pancreatitis. She developed afib during admit and was asymptomatic. She converted to NSR on IV Cardizem. Note EKG 5/6/15 showed NSR, inferior wall infarct and nonspecific ST changes. She returned back to the ER on 05/06/2015 with recurrent acute pancreatitis. She underwent ERCP on 05/11/2015 with cholecystectomy on 05/12/2015. She had PAF during hospitalization and note that she did again NOT feel symptoms with the arrhythmia. Admitted 12/26/19 with c/o SOB and 10 lb weight gain. Diagnosed with acute diastolic CHF and diuresed with IV lasix. Most recent [address] was obtained 12/26/19 negative for ischemia. Most recent ECHO 12/26/19 EF 55-60% (no change from 4/15 study). Most recent EKG 12/26/19 showed SR, 1st degree AV block, non specific ST change. Note acute on CRI with lab 1/17/20 BUN/Cr=57/2.04) and held lasix. Renal function improved since then. Today she states she is here for routine follow up. She is tolerating her medications and is able to afford them. Dr. Hollins Bernardino clonidine 0.2mg TID and started hydralazine 50mg TID  6 months ago. She feels like she feels better overall with this change. Her weight is down 5 lbs since August. Today=160#.  Since her last visit she underwent a cystoscopy with Dr. Jackson Elena which was normal.      Past Medical History: VITAMIN D-3 (COLECALCIFEROL) 400 UNITS TABS Take by mouth daily. Yes Historical Provider, MD   Docusate Calcium (STOOL SOFTENER PO) Take 1 tablet by mouth daily. Yes Historical Provider, MD   famotidine (PEPCID) 20 MG tablet Take 20 mg by mouth daily. Yes Historical Provider, MD      Allergies:  Aspirin     Review of Systems:   · Constitutional: there has been no unanticipated weight loss. There's been no change in energy level, sleep pattern, or activity level. · Eyes: No visual changes or diplopia. No scleral icterus. · ENT: No Headaches, hearing loss or vertigo. No mouth sores or sore throat. · Cardiovascular: Reviewed in HPI  · Respiratory: No cough or wheezing, no sputum production. No hematemesis. · Gastrointestinal: No abdominal pain, appetite loss, blood in stools. No change in bowel or bladder habits. · Genitourinary: No dysuria, trouble voiding, or hematuria. · Musculoskeletal:  No gait disturbance, weakness or joint complaints. · Integumentary: No rash or pruritis. · Neurological: No headache, diplopia, change in muscle strength, numbness or tingling. No change in gait, balance, coordination, mood, affect, memory, mentation, behavior. · Psychiatric: No anxiety, no depression. · Endocrine: No malaise, fatigue or temperature intolerance. No excessive thirst, fluid intake, or urination. No tremor. · Hematologic/Lymphatic: No abnormal bruising or bleeding, blood clots or swollen lymph nodes. · Allergic/Immunologic: No nasal congestion or hives.     Physical Examination:      /78   Pulse 66   Ht 5' 3\" (1.6 m)   Wt 160 lb (72.6 kg)   SpO2 98%   BMI 28.34 kg/m²        Constitutional and General Appearance: NAD   Respiratory:  · Normal excursion and expansion without use of accessory muscles  · Resp Auscultation: Normal breath sounds without dullness  Cardiovascular:  · The apical impulses not displaced  · Heart tones are crisp and normal, regular rhythm 2. Hypertension: Well controlled improved from prior and will continue current medical regimen. Renal Dr. Daryl Randall clonidine 0.2mg TID and started hydralazine 50mg TID  6 months ago. 3. DM II:  Per PCP    4. Hypothyroidism: Per PCP      5. Edema:  None noted. 6. Acute diastolic CHF: Compensated. Diagnosed 12/19 during admission. Likely from long-standing HTN. She was diuresed about 30# (190 down to 160#). She takes 20mg lasix daily. She can take extra tablet for weight 165# or >, increased swelling, and/or SOB. I have instructed her on fluid and salt restriction also. Plan:  1. Continue current medications- Xarelto 15 mg daily due to GFR < 50mL/min. 2. Follow up with me in one year   3. No need for cardiac testing as she is doing well     Scribe's attestation: This note was scribed in the presence of Herb Mccullough M.D. By Bi Tay, . Uzair Jc, personally performed the services described in this documentation, as scribed by the above signed scribe in my presence. It is both accurate and complete to my knowledge. I agree with the details independently gathered by the clinical support staff, while the remaining scribed note accurately describes my personal service to the patient. Cost of prescription medications and patient compliance have been reviewed with patient. All questions answered. Thank you for allowing me to participate in the care of this individual.     Hussein Montiel.  Didier Medina M.D., [facility] [place]

## 2021-02-04 ENCOUNTER — OFFICE VISIT (OUTPATIENT)
Dept: CARDIOLOGY CLINIC | Age: 86
End: 2021-02-04
Payer: MEDICARE

## 2021-02-04 VITALS
WEIGHT: 160 LBS | HEART RATE: 66 BPM | OXYGEN SATURATION: 98 % | DIASTOLIC BLOOD PRESSURE: 78 MMHG | HEIGHT: 63 IN | BODY MASS INDEX: 28.35 KG/M2 | SYSTOLIC BLOOD PRESSURE: 136 MMHG

## 2021-02-04 DIAGNOSIS — I48.0 PAF (PAROXYSMAL ATRIAL FIBRILLATION) (HCC): Primary | ICD-10-CM

## 2021-02-04 DIAGNOSIS — I10 ESSENTIAL HYPERTENSION: ICD-10-CM

## 2021-02-04 DIAGNOSIS — R07.2 PRECORDIAL PAIN: ICD-10-CM

## 2021-02-04 DIAGNOSIS — I50.33 ACUTE ON CHRONIC DIASTOLIC CONGESTIVE HEART FAILURE (HCC): ICD-10-CM

## 2021-02-04 DIAGNOSIS — R00.1 BRADYCARDIA: ICD-10-CM

## 2021-02-04 PROCEDURE — 99214 OFFICE O/P EST MOD 30 MIN: CPT | Performed by: INTERNAL MEDICINE

## 2021-02-04 RX ORDER — DILTIAZEM HYDROCHLORIDE 240 MG/1
CAPSULE, COATED, EXTENDED RELEASE ORAL
Qty: 30 CAPSULE | Refills: 11 | Status: SHIPPED | OUTPATIENT
Start: 2021-02-04 | End: 2022-04-14 | Stop reason: SINTOL

## 2021-02-04 RX ORDER — HYDRALAZINE HYDROCHLORIDE 50 MG/1
50 TABLET, FILM COATED ORAL 3 TIMES DAILY
COMMUNITY
Start: 2021-01-28 | End: 2022-04-14 | Stop reason: DRUGHIGH

## 2021-02-04 RX ORDER — FUROSEMIDE 40 MG/1
40 TABLET ORAL DAILY
Qty: 60 TABLET | Refills: 11 | Status: SHIPPED | OUTPATIENT
Start: 2021-02-04 | End: 2022-04-14 | Stop reason: DRUGHIGH

## 2021-02-04 NOTE — LETTER
Admitted 19 with c/o SOB and 10 lb weight gain. Diagnosed with acute diastolic CHF and diuresed with IV lasix. Most recent Beat[facility] Nightingale was obtained 19 negative for ischemia. Most recent ECHO 19 EF 55-60% (no change from 4/15 study). Most recent EKG 19 showed SR, 1st degree AV block, non specific ST change. Note acute on CRI with lab 20 BUN/Cr=57/2.04) and held lasix. Renal function improved since then. Today she states she is here for routine follow up. She is tolerating her medications and is able to afford them. Dr. Shea Ludwig clonidine 0.2mg TID and started hydralazine 50mg TID  6 months ago. She feels like she feels better overall with this change. Her weight is down 5 lbs since August. Today=160#. Since her last visit she underwent a cystoscopy with Dr. Rossy العلي which was normal.      Past Medical History:   has a past medical history of CKD (chronic kidney disease) stage 3, GFR 30-59 ml/min, Diabetes mellitus ([facility] [address] Hyperlipidemia, Hypertension, and Thyroid disease. Surgical History:   has a past surgical history that includes Dilation and curettage of uterus; Tubal ligation; Bladder surgery; Skin cancer excision;  section; Colonoscopy (2007); ERCP (2015); and Cholecystectomy (05/12/15). Social History:   reports that she has never smoked. She has never used smokeless tobacco. She reports current alcohol use. She reports that she does not use drugs. Family History:  family history includes Cancer in her brother and sister. No significant heart disease in parents    Home Medications:  Prior to Admission medications    Medication Sig Start Date End Date Taking?  Authorizing Provider   hydrALAZINE (APRESOLINE) 50 MG tablet 50 mg 3 times daily  21  Yes Historical Provider, MD   XARELTO 15 MG TABS tablet TAKE ONE TABLET BY MOUTH DAILY WITH BREAKFAST 21  Yes Jojo Avery MD CARTIA  MG extended release capsule TAKE ONE CAPSULE BY MOUTH DAILY 9/28/20  Yes Elvie Chisholm MD   metoprolol succinate (TOPROL XL) 100 MG extended release tablet TAKE ONE TABLET BY MOUTH DAILY 5/5/20  Yes Honey Dozier MD   losartan (COZAAR) 100 MG tablet Take 1 tablet by mouth daily 4/27/20  Yes Elvie Chisholm MD   furosemide (LASIX) 40 MG tablet Take 1 tablet by mouth daily  Patient taking differently: Take 20 mg by mouth daily as needed  1/1/20  Yes Conner Juarez MD   pravastatin (PRAVACHOL) 20 MG tablet Take 20 mg by mouth daily. Yes Historical Provider, MD   fenofibrate (TRICOR) 54 MG tablet Take 54 mg by mouth daily. [facility] pharmacy: Please dispense generic fenofibrate unless prescriber denote   Yes Historical Provider, MD   levothyroxine (SYNTHROID) 75 MCG tablet Take 88 mcg by mouth Daily    Yes Historical Provider, MD   VITAMIN D-3 (COLECALCIFEROL) 400 UNITS TABS Take by mouth daily. Yes Historical Provider, MD   Docusate Calcium (STOOL SOFTENER PO) Take 1 tablet by mouth daily. Yes Historical Provider, MD   famotidine (PEPCID) 20 MG tablet Take 20 mg by mouth daily. Yes Historical Provider, MD      Allergies:  Aspirin     Review of Systems:   · Constitutional: there has been no unanticipated weight loss. There's been no change in energy level, sleep pattern, or activity level. · Eyes: No visual changes or diplopia. No scleral icterus. · ENT: No Headaches, hearing loss or vertigo. No mouth sores or sore throat. · Cardiovascular: Reviewed in HPI  · Respiratory: No cough or wheezing, no sputum production. No hematemesis. · Gastrointestinal: No abdominal pain, appetite loss, blood in stools. No change in bowel or bladder habits. · Genitourinary: No dysuria, trouble voiding, or hematuria. · Musculoskeletal:  No gait disturbance, weakness or joint complaints. · Integumentary: No rash or pruritis. · Neurological: No headache, diplopia, change in muscle strength, numbness or tingling. No change in gait, balance, coordination, mood, affect, memory, mentation, behavior. · Psychiatric: No anxiety, no depression. · Endocrine: No malaise, fatigue or temperature intolerance. No excessive thirst, fluid intake, or urination. No tremor. · Hematologic/Lymphatic: No abnormal bruising or bleeding, blood clots or swollen lymph nodes. · Allergic/Immunologic: No nasal congestion or hives. Physical Examination:      /78   Pulse 66   Ht 5' 3\" (1.6 m)   Wt 160 lb (72.6 kg)   SpO2 98%   BMI 28.34 kg/m²        Constitutional and General Appearance: NAD   Respiratory:  · Normal excursion and expansion without use of accessory muscles  · Resp Auscultation: Normal breath sounds without dullness  Cardiovascular:  · The apical impulses not displaced  · Heart tones are crisp and normal, regular rhythm   · Cervical veins are not engorged  · The carotid upstroke is normal in amplitude and contour without delay or bruit  · Normal S1S2, No S3, no murmur   · Peripheral pulses are symmetrical and full  · There is no clubbing, cyanosis of the extremities.   · No edema   · Femoral Arteries: 2+ and equal  · Pedal Pulses: 2+ and equal   Abdomen:  · No masses or tenderness  · Liver/Spleen: No Abnormalities Noted  Neurological/Psychiatric:  · Alert and oriented in all spheres  · Moves all extremities well  · Exhibits normal gait balance and coordination  · No abnormalities of mood, affect, memory, mentation, or behavior are noted    Lab Results   Component Value Date    CHOL 146 12/28/2019    CHOL 189 09/26/2017    CHOL 200 02/19/2016     Lab Results   Component Value Date    TRIG 125 12/28/2019    TRIG 179 (H) 09/26/2017    TRIG 123 02/19/2016     Lab Results   Component Value Date    HDL 47 01/27/2020    HDL 57 12/28/2019    HDL 48 (L) 09/26/2017     Lab Results   Component Value Date    LDLCALC 88 01/27/2020 LDLCALC 64 12/28/2019    LDLCALC 105 09/26/2017     Lab Results   Component Value Date    LABVLDL 26 01/27/2020    LABVLDL 25 12/28/2019    LABVLDL 20 04/21/2015     Lab Results   Component Value Date    CHOLHDLRATIO 3.9 09/26/2017       LABS: 10/19/2020 NA+ 139, K+ 4.2, BUN 24, Creatinine 1.10, A1C 8.5, , HDL 52, , H/H 12.7/39.1, PLTS 192, ALT 11, AST 15, GFR is 46. Assessment:      1. Atrial Fibrillation:  Paroxysmal afib with CHADS score=5. Due to CRI I will continue lower dose xarelto  15mg daily for anticoagulation (CrCl <50mL/min) on most recent lab 10/20. Note that she is not able to feel when she was in AFib. She remains NSR on auscultation today. Prior saw Dr. Jovon Buck for nephrology but now sees Dr. Stephenie Chapman. 2. Hypertension: Well controlled improved from prior and will continue current medical regimen. Renal Dr. Amador Dotter clonidine 0.2mg TID and started hydralazine 50mg TID  6 months ago. 3. DM II:  Per PCP    4. Hypothyroidism: Per PCP      5. Edema:  None noted. 6. Acute diastolic CHF: Compensated. Diagnosed 12/19 during admission. Likely from long-standing HTN. She was diuresed about 30# (190 down to 160#). She takes 20mg lasix daily. She can take extra tablet for weight 165# or >, increased swelling, and/or SOB. I have instructed her on fluid and salt restriction also. Plan:  1. Continue current medications- Xarelto 15 mg daily due to GFR < 50mL/min. 2. Follow up with me in one year   3. No need for cardiac testing as she is doing well     Scribe's attestation:   This note was scribed in the presence of Arturo Prescott M.D. By Sarah Victoria RN I, Dr. Leandro Lawrence, personally performed the services described in this documentation, as scribed by the above signed scribe in my presence. It is both accurate and complete to my knowledge. I agree with the details independently gathered by the clinical support staff, while the remaining scribed note accurately describes my personal service to the patient. Cost of prescription medications and patient compliance have been reviewed with patient. All questions answered. Thank you for allowing me to participate in the care of this individual.     Hima Banda.  Melissa Caceres M.D., [facility] [place]               Sincerely,      Elvie Chisholm MD

## 2021-02-04 NOTE — PATIENT INSTRUCTIONS
Plan:  1. Continue current medications- Xarelto 15 mg daily due to GFR at 46.   2. Follow up with me in one year   3.  No need for cardiac testing as she is doing well

## 2021-06-02 ENCOUNTER — HOSPITAL ENCOUNTER (EMERGENCY)
Age: 86
Discharge: HOME | End: 2021-06-02
Payer: MEDICARE

## 2021-06-02 ENCOUNTER — APPOINTMENT (OUTPATIENT)
Dept: ULTRASOUND IMAGING | Age: 86
End: 2021-06-02
Payer: MEDICARE

## 2021-06-02 VITALS
BODY MASS INDEX: 28.34 KG/M2 | TEMPERATURE: 98.5 F | HEIGHT: 62 IN | HEART RATE: 75 BPM | RESPIRATION RATE: 18 BRPM | WEIGHT: 154 LBS | DIASTOLIC BLOOD PRESSURE: 113 MMHG | OXYGEN SATURATION: 96 % | SYSTOLIC BLOOD PRESSURE: 177 MMHG

## 2021-06-02 DIAGNOSIS — N93.9 VAGINAL BLEEDING: Primary | ICD-10-CM

## 2021-06-02 LAB
A/G RATIO: 1.6 (ref 1.1–2.2)
ALBUMIN SERPL-MCNC: 3.9 G/DL (ref 3.4–5)
ALP BLD-CCNC: 40 U/L (ref 40–129)
ALT SERPL-CCNC: 9 U/L (ref 10–40)
ANION GAP SERPL CALCULATED.3IONS-SCNC: 10 MMOL/L (ref 3–16)
AST SERPL-CCNC: 14 U/L (ref 15–37)
BACTERIA: ABNORMAL /HPF
BASOPHILS ABSOLUTE: 0 K/UL (ref 0–0.2)
BASOPHILS RELATIVE PERCENT: 0.5 %
BILIRUB SERPL-MCNC: 0.3 MG/DL (ref 0–1)
BILIRUBIN URINE: NEGATIVE
BLOOD, URINE: ABNORMAL
BUN BLDV-MCNC: 32 MG/DL (ref 7–20)
CALCIUM SERPL-MCNC: 9.9 MG/DL (ref 8.3–10.6)
CHLORIDE BLD-SCNC: 105 MMOL/L (ref 99–110)
CLARITY: ABNORMAL
CO2: 23 MMOL/L (ref 21–32)
COLOR: ABNORMAL
CREAT SERPL-MCNC: 1 MG/DL (ref 0.6–1.2)
EOSINOPHILS ABSOLUTE: 0.1 K/UL (ref 0–0.6)
EOSINOPHILS RELATIVE PERCENT: 0.7 %
EPITHELIAL CELLS, UA: ABNORMAL /HPF (ref 0–5)
GFR AFRICAN AMERICAN: >60
GFR NON-AFRICAN AMERICAN: 53
GLOBULIN: 2.5 G/DL
GLUCOSE BLD-MCNC: 180 MG/DL (ref 70–99)
GLUCOSE URINE: NEGATIVE MG/DL
HCT VFR BLD CALC: 30.1 % (ref 36–48)
HEMOGLOBIN: 10.2 G/DL (ref 12–16)
KETONES, URINE: NEGATIVE MG/DL
LEUKOCYTE ESTERASE, URINE: NEGATIVE
LYMPHOCYTES ABSOLUTE: 1 K/UL (ref 1–5.1)
LYMPHOCYTES RELATIVE PERCENT: 12.9 %
MCH RBC QN AUTO: 27.6 PG (ref 26–34)
MCHC RBC AUTO-ENTMCNC: 34.1 G/DL (ref 31–36)
MCV RBC AUTO: 81.1 FL (ref 80–100)
MICROSCOPIC EXAMINATION: YES
MONOCYTES ABSOLUTE: 0.3 K/UL (ref 0–1.3)
MONOCYTES RELATIVE PERCENT: 3.9 %
NEUTROPHILS ABSOLUTE: 6.1 K/UL (ref 1.7–7.7)
NEUTROPHILS RELATIVE PERCENT: 82 %
NITRITE, URINE: NEGATIVE
PDW BLD-RTO: 15.5 % (ref 12.4–15.4)
PH UA: 5.5 (ref 5–8)
PLATELET # BLD: 142 K/UL (ref 135–450)
PMV BLD AUTO: 8.5 FL (ref 5–10.5)
POTASSIUM SERPL-SCNC: 3.8 MMOL/L (ref 3.5–5.1)
PROTEIN UA: 100 MG/DL
RBC # BLD: 3.71 M/UL (ref 4–5.2)
RBC UA: >100 /HPF (ref 0–4)
SODIUM BLD-SCNC: 138 MMOL/L (ref 136–145)
SPECIFIC GRAVITY UA: >=1.03 (ref 1–1.03)
SPECIMEN STATUS: NORMAL
SPECIMEN STATUS: NORMAL
TOTAL PROTEIN: 6.4 G/DL (ref 6.4–8.2)
URINE REFLEX TO CULTURE: YES
URINE TYPE: ABNORMAL
UROBILINOGEN, URINE: 0.2 E.U./DL
WBC # BLD: 7.4 K/UL (ref 4–11)
WBC UA: ABNORMAL /HPF (ref 0–5)

## 2021-06-02 PROCEDURE — 81001 URINALYSIS AUTO W/SCOPE: CPT

## 2021-06-02 PROCEDURE — 80053 COMPREHEN METABOLIC PANEL: CPT

## 2021-06-02 PROCEDURE — 99283 EMERGENCY DEPT VISIT LOW MDM: CPT

## 2021-06-02 PROCEDURE — 76856 US EXAM PELVIC COMPLETE: CPT

## 2021-06-02 PROCEDURE — 36415 COLL VENOUS BLD VENIPUNCTURE: CPT

## 2021-06-02 PROCEDURE — 85025 COMPLETE CBC W/AUTO DIFF WBC: CPT

## 2021-06-04 ENCOUNTER — TELEPHONE (OUTPATIENT)
Dept: CARDIOLOGY CLINIC | Age: 86
End: 2021-06-04

## 2021-06-04 NOTE — TELEPHONE ENCOUNTER
Tell her to  stay off xarelto for now given recent bleeding and anemia. I would recommend getting seen by OB/GYN to see if any concerning issue with vaginal bleeding other than possibly being due to anticoagulation and let me know what happens with opinion of doc.  Thanks

## 2021-06-04 NOTE — TELEPHONE ENCOUNTER
Per pt was seen @ [facility] ER yesterday for vaginal bleeding. Per pt was told Blood work revealed she is Anemic. See Care Everywhere ER visit encounter in chart from yesterday. Pt was prescribed Provera 10 mg one tab daily for 10 days. Pt's PCP told pt to call office to see if [facility] wants pt to stay on Xarelto? Last ov 2.4.21    Assessment:       1. Atrial Fibrillation:  Paroxysmal afib with CHADS score=5. Due to CRI I will continue lower dose xarelto  15mg daily for anticoagulation (CrCl <50mL/min) on most recent lab 10/20. Note that she is not able to feel when she was in AFib. She remains NSR on auscultation today. Prior saw Dr. Didier Wynne for nephrology but now sees Dr. Reema Arevalo.     2. Hypertension: Well controlled improved from prior and will continue current medical regimen. Renal Dr. Estes Gambles clonidine 0.2mg TID and started hydralazine 50mg TID  6 months ago.     3. DM II:  Per PCP     4. Hypothyroidism: Per PCP       5. Edema:  None noted.      6. Acute diastolic CHF: Compensated. Diagnosed 12/19 during admission. Likely from long-standing HTN. She was diuresed about 30# (190 down to 160#). She takes 20mg lasix daily. She can take extra tablet for weight 165# or >, increased swelling, and/or SOB. I have instructed her on fluid and salt restriction also.      Plan:  1. Continue current medications- Xarelto 15 mg daily due to GFR < 50mL/min. 2. Follow up with me in one year   3.  No need for cardiac testing as she is doing well      Scribe's attestation:  This note was scribed in the presence of Marine Almaguer M.D. By Tod Fowler

## 2021-06-07 NOTE — ED PROVIDER NOTES
[address] [place] [address]  ED  EMERGENCY DEPARTMENT ENCOUNTER      This patient was not seen and evaluated by the attending physician. Pt Name: Tony John  MRN: [id number]  [place] 1934  Date of evaluation: 2021  Provider: VIKTORIYA Field - CNP-C  PCP: Kelsey Barbosa MD      History provided by the patient. CHIEFCOMPLAINT:     Chief Complaint   Patient presents with    Vaginal Bleeding     vaginal bleeding started getting bad yesterday, pt had procedure in  to \"enlarge urethra\" and bleed has been off and on. HISTORY OF PRESENT ILLNESS:      Tony John is a 80 y.o. female who presents to [address] [place] [address]  ED with complaints of vaginal bleeding. Patient states that she had vaginal bleeding that started getting bad yesterday, patient states that she had some type of procedure in February to enlarge her urethra when she is had some bleeding noted on and off. She is unsure if it is vaginal or urethral.  Patient states that she has been passing clots on a regular basis. She denies any pain. Denies any dizziness lightheadedness or any other complaints. She does have an appointment to follow-up with OB/GYN, has not actually seen them yet. She is here for further evaluation. LOCATION:-  QUALITY:-  SEVERITY:-  DURATION:-  MODIFYING FACTORS:-    Nursing Notes were reviewed     REVIEW OF SYSTEMS:     Review of Systems  All systems, a total of 10, are reviewed and negative except for those that were just noted in history present illness.         PAST MEDICAL HISTORY:     Past Medical History:   Diagnosis Date    CKD (chronic kidney disease) stage 3, GFR 30-59 ml/min (HCC)     Diabetes mellitus ([facility] [address]     without complications    Hyperlipidemia     Hypertension     Thyroid disease          SURGICAL HISTORY:      Past Surgical History:   Procedure Laterality Date    BLADDER SURGERY       SECTION      CHOLECYSTECTOMY  05/12/15 LAPAROSCOPIC CHOLECYSTECTOMY              COLONOSCOPY  2007 2012    Polyps    DILATION AND CURETTAGE OF UTERUS      ERCP  05/11/2015    SKIN CANCER EXCISION      TUBAL LIGATION           CURRENT MEDICATIONS:       Discharge Medication List as of 6/2/2021  1:58 PM      CONTINUE these medications which have NOT CHANGED    Details   hydrALAZINE (APRESOLINE) 50 MG tablet 50 mg 3 times daily Historical Med      rivaroxaban (XARELTO) 15 MG TABS tablet TAKE ONE TABLET BY MOUTH DAILY WITH BREAKFAST, Disp-30 tablet, R-11Normal      furosemide (LASIX) 40 MG tablet Take 1 tablet by mouth daily, Disp-60 tablet, R-11Normal      dilTIAZem (CARTIA XT) 240 MG extended release capsule TAKE ONE CAPSULE BY MOUTH DAILY, Disp-30 capsule, R-11Normal      metoprolol succinate (TOPROL XL) 100 MG extended release tablet TAKE ONE TABLET BY MOUTH DAILY, Disp-90 tablet,R-2Normal      losartan (COZAAR) 100 MG tablet Take 1 tablet by mouth daily, Disp-90 tablet, R-1Normal      pravastatin (PRAVACHOL) 20 MG tablet Take 20 mg by mouth daily. fenofibrate (TRICOR) 54 MG tablet Take 54 mg by mouth daily. [facility] pharmacy: Please dispense generic fenofibrate unless prescriber denote      levothyroxine (SYNTHROID) 75 MCG tablet Take 88 mcg by mouth Daily Historical Med      VITAMIN D-3 (COLECALCIFEROL) 400 UNITS TABS Take by mouth daily. , Oral, DAILY, Until Discontinued, Historical Med      Docusate Calcium (STOOL SOFTENER PO) Take 1 tablet by mouth daily. famotidine (PEPCID) 20 MG tablet Take 20 mg by mouth daily.                ALLERGIES:    Aspirin    FAMILY HISTORY:       Family History   Problem Relation Age of Onset   [facility] Cancer Brother         lung    Cancer Sister         female          SOCIAL HISTORY:     Social History     Socioeconomic History    Marital status:      Spouse name: None    Number of children: 4    Years of education: 6    Highest education level: None   Occupational History    None   Tobacco Use    Smoking status: Never Smoker    Smokeless tobacco: Never Used   Vaping Use    Vaping Use: Never used   Substance and Sexual Activity    Alcohol use: Yes     Alcohol/week: 0.0 standard drinks     Comment: \"occasional, very seldom\"    Drug use: No    Sexual activity: None   Other Topics Concern    None   Social History Narrative    None     Social Determinants of Health     Financial Resource Strain:     Difficulty of Paying Living Expenses:    Food Insecurity:     Worried About Running Out of Food in the Last Year:     Ran Out of Food in the Last Year:    Transportation Needs:     Lack of Transportation (Medical):  Lack of Transportation (Non-Medical):    Physical Activity:     Days of Exercise per Week:     Minutes of Exercise per Session:    Stress:     Feeling of Stress :    Social Connections:     Frequency of Communication with Friends and Family:     Frequency of Social Gatherings with Friends and Family:     Attends Druze Services:     Active Member of Clubs or Organizations:     Attends Club or Organization Meetings:     Marital Status:    Intimate Partner Violence:     Fear of Current or Ex-Partner:     Emotionally Abused:     Physically Abused:     Sexually Abused:        SCREENINGS:             PHYSICAL EXAM:       ED Triage Vitals [06/02/21 1015]   BP Temp Temp Source Pulse Resp SpO2 Height Weight   (!) 194/74 98.5 °F (36.9 °C) Oral 72 18 97 % 5' 2\" (1.575 m) 154 lb (69.9 kg)       Physical Exam    CONSTITUTIONAL: Awake and alert. Cooperative. Well-developed. Well-nourished. Vitals:    06/02/21 1015 06/02/21 1055 06/02/21 1259 06/02/21 1431   BP: (!) 194/74 (!) 198/61 (!) 191/74 (!) 177/113   Pulse: 72  69 75   Resp: 18 18 18    Temp: 98.5 °F (36.9 °C)      TempSrc: Oral      SpO2: 97% 98% 98% 96%   Weight: 154 lb (69.9 kg)      Height: 5' 2\" (1.575 m)        HENT: Normocephalic. Atraumatic. External ears normal, without discharge. TMs clear bilaterally.  Nonasal discharge. Oropharynx clear, no erythema. Mucous membranes moist.  EYES: Conjunctiva non-injected, nolid abnormalities noted. No scleral icterus. PERRL. EOM's grossly intact. Anterior chambers clear. NECK: Supple. Normal ROM. No meningismus. No thyroid tenderness or swelling noted. CARDIOVASCULAR: RRR. No Murmer. No carotid bruits. PULMONARY/CHEST WALL: Effort normal. No tachypnea. Lungs clear to ausculation. ABDOMEN: Normal BS. Soft. Nondistended. No tenderness to palpation. No guarding. No hernias noted. No splenomegaly. Back: Spine is midline. No ecchymosis. No crepituson palpation. No obvious subluxation of vertebral column. No saddle anesthesia or evidence of cauda equina. /ANORECTAL: Normal external genitalia. Patient vaginal exam did show blood in the vaginal vault without active hemorrhaging. No lacerations or abnormalities noted. MUSKULOSKELETAL: Normal ROM. No acute deformities. No edema. No tenderness to palpate. SKIN: Warm and dry. NEUROLOGICAL:  GCS 15. CN II-XII grossly intact. Strength is 5/5 in all extremities and sensation is intact. PSYCHIATRIC: Normal affect, normal insight and judgement. Alert and oriented x 3.         DIAGNOSTIC RESULTS:     LABS:    Results for orders placed or performed during the hospital encounter of 06/02/21   Urinalysis Reflex to Culture    Specimen: Urine, clean catch   Result Value Ref Range    Color, UA RED (A) Straw/Yellow    Clarity, UA CLOUDY (A) Clear    Glucose, Ur Negative Negative mg/dL    Bilirubin Urine Negative Negative    Ketones, Urine Negative Negative mg/dL    Specific Gravity, UA >=1.030 1.005 - 1.030    Blood, Urine LARGE (A) Negative    pH, UA 5.5 5.0 - 8.0    Protein,  (A) Negative mg/dL    Urobilinogen, Urine 0.2 <2.0 E.U./dL    Nitrite, Urine Negative Negative    Leukocyte Esterase, Urine Negative Negative    Microscopic Examination YES     Urine Type NotGiven     Urine Reflex to Culture Yes    CBC auto differential   Result Value Ref Range    WBC 7.4 4.0 - 11.0 K/uL    RBC 3.71 (L) 4.00 - 5.20 M/uL    Hemoglobin 10.2 (L) 12.0 - 16.0 g/dL    Hematocrit 30.1 (L) 36.0 - 48.0 %    MCV 81.1 80.0 - 100.0 fL    MCH 27.6 26.0 - 34.0 pg    MCHC 34.1 31.0 - 36.0 g/dL    RDW 15.5 (H) 12.4 - 15.4 %    Platelets 950 607 - 300 K/uL    MPV 8.5 5.0 - 10.5 fL    Neutrophils % 82.0 %    Lymphocytes % 12.9 %    Monocytes % 3.9 %    Eosinophils % 0.7 %    Basophils % 0.5 %    Neutrophils Absolute 6.1 1.7 - 7.7 K/uL    Lymphocytes Absolute 1.0 1.0 - 5.1 K/uL    Monocytes Absolute 0.3 0.0 - 1.3 K/uL    Eosinophils Absolute 0.1 0.0 - 0.6 K/uL    Basophils Absolute 0.0 0.0 - 0.2 K/uL   Comprehensive metabolic panel   Result Value Ref Range    Sodium 138 136 - 145 mmol/L    Potassium 3.8 3.5 - 5.1 mmol/L    Chloride 105 99 - 110 mmol/L    CO2 23 21 - 32 mmol/L    Anion Gap 10 3 - 16    Glucose 180 (H) 70 - 99 mg/dL    BUN 32 (H) 7 - 20 mg/dL    CREATININE 1.0 0.6 - 1.2 mg/dL    GFR Non- 53 (A) >60    GFR African American >60 >60    Calcium 9.9 8.3 - 10.6 mg/dL    Total Protein 6.4 6.4 - 8.2 g/dL    Albumin 3.9 3.4 - 5.0 g/dL    Albumin/Globulin Ratio 1.6 1.1 - 2.2    Total Bilirubin 0.3 0.0 - 1.0 mg/dL    Alkaline Phosphatase 40 40 - 129 U/L    ALT 9 (L) 10 - 40 U/L    AST 14 (L) 15 - 37 U/L    Globulin 2.5 g/dL   Microscopic Urinalysis   Result Value Ref Range    WBC, UA see below (A) 0 - 5 /HPF    RBC, UA >100 (A) 0 - 4 /HPF    Epithelial Cells, UA 2-5 0 - 5 /HPF    Bacteria, UA Rare (A) None Seen /HPF   Sample possible blood bank testing   Result Value Ref Range    Specimen Status CHAD    Sample possible blood bank testing   Result Value Ref Range    Specimen Status CHAD          RADIOLOGY:  All x-ray studies are viewed/reviewed by me. Formal interpretations per the radiologist are as follows:      [address]   Final Result   Thickened heterogeneous endometrium concerning for malignancy until proven   otherwise.       The findings were sent to the Radiology Results [address] at 12:12   pm on 6/2/2021to be communicated to a licensed caregiver. EKG:  See EKG interpretation by an attending physician. PROCEDURES:   N/A    CRITICAL CARE TIME:   N/A    CONSULTS:  IP CONSULT TO OB GYN      EMERGENCY DEPARTMENT COURSE andDIFFERENTIAL DIAGNOSIS/MDM:   Vitals:    Vitals:    06/02/21 1015 06/02/21 1055 06/02/21 1259 06/02/21 1431   BP: (!) 194/74 (!) 198/61 (!) 191/74 (!) 177/113   Pulse: 72  69 75   Resp: 18 18 18    Temp: 98.5 °F (36.9 °C)      TempSrc: Oral      SpO2: 97% 98% 98% 96%   Weight: 154 lb (69.9 kg)      Height: 5' 2\" (1.575 m)          Patient wasgiven the following medications:  Medications - No data to display      Patient was evaluated independently by myself with the attending physician available for consultation. Patient presented to the emergency room today with complaints of vaginal bleeding. On vaginal exam patient did have some blood within the vaginal vault with no active bleeding. Patient has had bleeding going on for several days. She is not anemic hemoglobin is 10.2, her vital signs are stable. They did request that I contact her OB/GYN because patient is concerned about the bleeding, I explained to them that she likely needs biopsy and further evaluation but nothing to do emergently. I did speak with Zaynab Mansfield, her OB/GYN that she is scheduled to see, she agreed with the plan for discharge home and follow-up as an outpatient. Patient family are in agreement with this. They can return to the ED for any worsening symptoms. Patient laboratory studies, radiographic imaging, and assessment were all discussed with the patient and/orpatient family. There was shared decision-making between myself as well as the patient and/or their surrogate and we are all in agreement with discharge home.   There was an opportunity for questions and all questions were answered tothe best of my ability and to the satisfaction of the patient and/or patient family. FINAL IMPRESSION:      1.  Vaginal bleeding          DISPOSITION/PLAN:   DISPOSITION Decision To Discharge      PATIENT REFERRED TO:  Con Call  [address] [place] [address] [phone]    Call   For follow up      DISCHARGE MEDICATIONS:  Discharge Medication List as of 6/2/2021  1:58 PM                     (Please note thatportions of this note were completed with a voice recognition program.  Efforts were made to edit the dictations, but occasionally words are mis-transcribed.)    VIKTORIYA Langford CNP-C (electronicallysigned)        VIKTORIYA Langford CNP  06/07/21 7305

## 2021-06-10 NOTE — TELEPHONE ENCOUNTER
Pt stated her OBGYN did a biopsy and sent it out to be tested. Pt is currently waiting on results. Pt needs to know if [facility] wants her to continue to stay off of the Xarelto until her biopsy results come back.

## 2021-06-10 NOTE — TELEPHONE ENCOUNTER
[facility], please advise on if you would like the pt to restart Xarelto or remain off of med until biopsy results come back?  Not sure how long results will be back

## 2021-06-11 NOTE — TELEPHONE ENCOUNTER
Spoke to pt and relayed message. She gets more blood work done on Monday.  Pt will let us know results from biopsy

## 2021-06-17 ENCOUNTER — TELEPHONE (OUTPATIENT)
Dept: CARDIOLOGY CLINIC | Age: 86
End: 2021-06-17

## 2021-06-17 NOTE — TELEPHONE ENCOUNTER
Pt is having a Hysterectomy on 06/23. She met with her oncologist today and was told to continue to stay off of the Xarelto until after her procedure. At this point the surgeon is only requesting clearance from PCP. Just an FYI.

## 2021-06-25 NOTE — TELEPHONE ENCOUNTER
I agree with holding xarelto per OB/GYN doc advice. Only restart when OB/GYN doc feels comfortable after surgery done.

## 2022-04-12 NOTE — PROGRESS NOTES
[address]   Cardiac Followup    Referring Provider:  Drake Santos MD     Chief Complaint   Patient presents with    1 Year Follow Up    Congestive Heart Failure    Atrial Fibrillation      Subjective: Ms Isauro Ann presents to day for cardiology follow up of HTN, HLD, PAF, and diastolic CHF; no complaints today    History of Present Illness:     Ms. Isauro Ann is an 80 y.o. female with history of HTN, HLD, PAF on low dose xarelto (CrCl <98LO/EFL), diastolic CHF dx 13/41, CRI, diet controlled DM, s/p hysterectomy, and thyroid disease. Admitted on 4/21/15 with gallstone pancreatitis. She developed afib during admit and was asymptomatic but converted NSR. She returned back to the ER on 05/06/2015 with recurrent acute pancreatitis. She underwent ERCP on 05/11/2015 with cholecystectomy on 05/12/2015. She had PAF during hospitalization and note that she did again NOT feel symptoms with the arrhythmia. Admitted 12/26/19 with c/o SOB and 10 lb weight gain. Diagnosed with acute diastolic CHF and diuresed with IV lasix. Most recent Coretha Ast was obtained 12/26/19 negative for ischemia. Most recent ECHO 12/26/19 EF 55-60% (no change from 4/15 study). Most recent EKG 12/26/19 showed SR, 1st degree AV block, non specific ST change. Note acute on CRI with lab 1/17/20 BUN/Cr=57/2.04) and held lasix. She was in the ED on 6/2/2021 with vaginal bleeding. Underwent hysterectomy eventually. Today, 4/12/2022, her weight is up 15 pounds since last visit (drggt=077#). She attributes this to increased junk food intake. She states that she is restricting her fluids per CHF guidelines. She states that she is dealing with vision issues and getting injections in eye (? Macular degeneration). She has only been taking her lasix 40mg once weekly. Patient denies current chest pain, sob, palpitations, dizziness or syncope.       Past Medical History:   has a past medical history of CKD (chronic kidney disease) stage 3, GFR 30-59 ml/min ([facility] [address] Diabetes mellitus ([facility] [address] Hyperlipidemia, Hypertension, and Thyroid disease. Surgical History:   has a past surgical history that includes Dilation and curettage of uterus; Tubal ligation; Bladder surgery; Skin cancer excision;  section; Colonoscopy (2007); ERCP (2015); and Cholecystectomy (05/12/15). Social History:   reports that she has never smoked. She has never used smokeless tobacco. She reports current alcohol use. She reports that she does not use drugs. Family History:  family history includes Cancer in her brother and sister. No significant heart disease in parents    Home Medications:  Prior to Admission medications    Medication Sig Start Date End Date Taking? Authorizing Provider   isosorbide mononitrate (IMDUR) 30 MG extended release tablet Take 30 mg by mouth daily   Yes Historical Provider, MD   NIFEdipine (ADALAT CC) 30 MG extended release tablet Take 30 mg by mouth daily   Yes Historical Provider, MD   ferrous sulfate (IRON 325) 325 (65 Fe) MG tablet Take 325 mg by mouth daily (with breakfast)   Yes Historical Provider, MD   rivaroxaban (XARELTO) 15 MG TABS tablet TAKE ONE TABLET BY MOUTH DAILY WITH BREAKFAST 3/10/22  Yes Noe Hammer MD   hydrALAZINE (APRESOLINE) 50 MG tablet 50 mg 3 times daily  21  Yes Historical Provider, MD   furosemide (LASIX) 40 MG tablet Take 1 tablet by mouth daily 21  Yes Maria Isabel Winn MD   metoprolol succinate (TOPROL XL) 100 MG extended release tablet TAKE ONE TABLET BY MOUTH DAILY 20  Yes Noe Hammer MD   losartan (COZAAR) 100 MG tablet Take 1 tablet by mouth daily 20  Yes Maria Isabel Winn MD   pravastatin (PRAVACHOL) 20 MG tablet Take 20 mg by mouth daily. Yes Historical Provider, MD   fenofibrate (TRICOR) 54 MG tablet Take 54 mg by mouth daily.  [facility] pharmacy: Please dispense generic fenofibrate unless prescriber denote   Yes Historical Provider, MD   levothyroxine (SYNTHROID) 75 MCG tablet Take 88 mcg by mouth Daily    Yes Historical Provider, MD   VITAMIN D-3 (COLECALCIFEROL) 400 UNITS TABS Take by mouth daily. Yes Historical Provider, MD   Docusate Calcium (STOOL SOFTENER PO) Take 1 tablet by mouth daily. Yes Historical Provider, MD   famotidine (PEPCID) 20 MG tablet Take 20 mg by mouth daily. Yes Historical Provider, MD   dilTIAZem (CARTIA XT) 240 MG extended release capsule TAKE ONE CAPSULE BY MOUTH DAILY  Patient not taking: Reported on 4/14/2022 2/4/21   Renee Alberts MD      Allergies:  Aspirin     Review of Systems:   · Constitutional: there has been no unanticipated weight loss. There's been no change in energy level, sleep pattern, or activity level. · Eyes: No visual changes or diplopia. No scleral icterus. · ENT: No Headaches, hearing loss or vertigo. No mouth sores or sore throat. · Cardiovascular: Reviewed in HPI  · Respiratory: No cough or wheezing, no sputum production. No hematemesis. · Gastrointestinal: No abdominal pain, appetite loss, blood in stools. No change in bowel or bladder habits. · Genitourinary: No dysuria, trouble voiding, or hematuria. · Musculoskeletal:  No gait disturbance, weakness or joint complaints. · Integumentary: No rash or pruritis. · Neurological: No headache, diplopia, change in muscle strength, numbness or tingling. No change in gait, balance, coordination, mood, affect, memory, mentation, behavior. · Psychiatric: No anxiety, no depression. · Endocrine: No malaise, fatigue or temperature intolerance. No excessive thirst, fluid intake, or urination. No tremor. · Hematologic/Lymphatic: No abnormal bruising or bleeding, blood clots or swollen lymph nodes. · Allergic/Immunologic: No nasal congestion or hives.     Physical Examination:      /68   Pulse 87   Ht 5' 2\" (1.575 m)   Wt 175 lb (79.4 kg)   SpO2 97%   BMI 32.01 kg/m²        Constitutional and General Appearance: NAD   Respiratory:  · Normal excursion and expansion without use of accessory muscles  · Resp Auscultation: Normal breath sounds without dullness  Cardiovascular:  · The apical impulses not displaced  · Heart tones are crisp and normal, regular rhythm. Soft AMA  · Cervical veins are not engorged  · The carotid upstroke is normal in amplitude and contour without delay or bruit  · Normal S1S2, No S3, no murmur   · Peripheral pulses are symmetrical and full  · There is no clubbing, cyanosis of the extremities. · Trace to 1+ BLE  · Femoral Arteries: 2+ and equal  · Pedal Pulses: 2+ and equal   Abdomen:  · No masses or tenderness  · Liver/Spleen: No Abnormalities Noted  Neurological/Psychiatric:  · Alert and oriented in all spheres  · Moves all extremities well  · Exhibits normal gait balance and coordination  · No abnormalities of mood, affect, memory, mentation, or behavior are noted    Lab Results   Component Value Date    CHOL 146 12/28/2019    CHOL 189 09/26/2017    CHOL 200 02/19/2016     Lab Results   Component Value Date    TRIG 125 12/28/2019    TRIG 179 (H) 09/26/2017    TRIG 123 02/19/2016     Lab Results   Component Value Date    HDL 47 01/27/2020    HDL 57 12/28/2019    HDL 48 (L) 09/26/2017     Lab Results   Component Value Date    LDLCALC 88 01/27/2020    LDLCALC 64 12/28/2019    LDLCALC 105 09/26/2017     Lab Results   Component Value Date    LABVLDL 26 01/27/2020    LABVLDL 25 12/28/2019    LABVLDL 20 04/21/2015     Lab Results   Component Value Date    CHOLHDLRATIO 3.9 09/26/2017       LABS:   4/8/2022 Na 142, K+ 4.8  3/7/2022 Hemoglobin 12.8  3/7/2022 , , HDL 51,   3/7/2022 ALT 14, AST 18      Assessment:      1. Atrial Fibrillation:  Paroxysmal afib with CHADS score=5. Due to CRI I will continue lower dose xarelto  15mg daily for anticoagulation (CrCl <50mL/min) on most recent lab 4/22. Note asymptomatic with AFib. She remains NSR on auscultation today.  Prior saw Dr. Jeny Will for nephrology but now sees  Geraldo Goodrich. 2. Hypertension: Adequately controlled and will continue current medical regimen. 3. DM II:  Per PCP    4. Hypothyroidism: Per PCP      5. Edema: Mild edema of LE. See #3 below. 6. Chronic diastolic CHF: Compensated. Diagnosed 12/19. Likely from long-standing HTN. She takes lasix 40mg PRN. She can take extra tablet for weight gain 3-4#, increased swelling, and/or SOB. I have instructed her on fluid and salt restriction also. Plan:  1. Adjusted medication list according to patient's home dosing. 2. We will reach out to Dr Geraldo Goodrich regarding weight gain and lasix dosing. I left him VM to call back with any recs. 3. Watch your diet, sodium, and fluid intake. Use lasix 40mg PRN. 4. Follow up in one year, or sooner if needed. 5. EKG today NSR 72bpm; 1st degree AV block; NST change (no sig change 12/19)    Cost of prescription medications and patient compliance have been reviewed with patient. All questions answered. Sandra Rasheed RN, am scribing for and in the presence of Dr. Britt Bull. 04/14/22 12:38 PM   Keneth Mcburney, RN    I, Dr. Birtt Bull, personally performed the services described in this documentation, as scribed by the above signed scribe in my presence. It is both accurate and complete to my knowledge. I agree with the details independently gathered by the clinical support staff, while the remaining scribed note accurately describes my personal service to the patient. Cost of prescription medications and patient compliance have been reviewed with patient. All questions answered. Thank you for allowing me to participate in the care of this individual.     Marcela Mancera.  Maximo Aguirre M.D., [facility] [place]

## 2022-04-14 ENCOUNTER — TELEPHONE (OUTPATIENT)
Dept: CARDIOLOGY CLINIC | Age: 87
End: 2022-04-14

## 2022-04-14 ENCOUNTER — OFFICE VISIT (OUTPATIENT)
Dept: CARDIOLOGY CLINIC | Age: 87
End: 2022-04-14
Payer: MEDICARE

## 2022-04-14 VITALS
DIASTOLIC BLOOD PRESSURE: 68 MMHG | HEIGHT: 62 IN | OXYGEN SATURATION: 97 % | BODY MASS INDEX: 32.2 KG/M2 | HEART RATE: 87 BPM | SYSTOLIC BLOOD PRESSURE: 134 MMHG | WEIGHT: 175 LBS

## 2022-04-14 DIAGNOSIS — I50.33 ACUTE ON CHRONIC DIASTOLIC CONGESTIVE HEART FAILURE (HCC): ICD-10-CM

## 2022-04-14 DIAGNOSIS — R00.1 BRADYCARDIA: ICD-10-CM

## 2022-04-14 DIAGNOSIS — I48.0 PAF (PAROXYSMAL ATRIAL FIBRILLATION) (HCC): Primary | ICD-10-CM

## 2022-04-14 PROCEDURE — 93000 ELECTROCARDIOGRAM COMPLETE: CPT | Performed by: INTERNAL MEDICINE

## 2022-04-14 PROCEDURE — 99214 OFFICE O/P EST MOD 30 MIN: CPT | Performed by: INTERNAL MEDICINE

## 2022-04-14 RX ORDER — HYDRALAZINE HYDROCHLORIDE 10 MG/1
50 TABLET, FILM COATED ORAL 3 TIMES DAILY
Qty: 30 TABLET | Refills: 0 | Status: SHIPPED
Start: 2022-04-14

## 2022-04-14 RX ORDER — ISOSORBIDE MONONITRATE 30 MG/1
30 TABLET, EXTENDED RELEASE ORAL DAILY
COMMUNITY

## 2022-04-14 RX ORDER — NIFEDIPINE 30 MG/1
30 TABLET, FILM COATED, EXTENDED RELEASE ORAL DAILY
COMMUNITY
End: 2022-04-14 | Stop reason: DRUGHIGH

## 2022-04-14 RX ORDER — FUROSEMIDE 40 MG/1
40 TABLET ORAL PRN
Qty: 30 TABLET | Refills: 3 | Status: SHIPPED | OUTPATIENT
Start: 2022-04-14

## 2022-04-14 RX ORDER — FUROSEMIDE 40 MG/1
40 TABLET ORAL PRN
Qty: 60 TABLET | Refills: 11 | Status: SHIPPED
Start: 2022-04-14 | End: 2022-04-14 | Stop reason: SDUPTHER

## 2022-04-14 RX ORDER — NIFEDIPINE 30 MG/1
30 TABLET, FILM COATED, EXTENDED RELEASE ORAL 2 TIMES DAILY
Qty: 30 TABLET | Refills: 0 | Status: SHIPPED
Start: 2022-04-14

## 2022-04-14 RX ORDER — FERROUS SULFATE 325(65) MG
325 TABLET ORAL
COMMUNITY

## 2022-04-14 NOTE — PATIENT INSTRUCTIONS
Plan:  1. Adjusted medication list according to patient's home dosing. 2. We will reach out to Dr Dandre Loyola regarding weight gain and lasix dosing. 3. Watch your diet, sodium, and fluid intake. 4. Follow up in one year, or sooner if needed.

## 2022-04-14 NOTE — LETTER
Jessenia Lainey  1934      [facility]   Cardiac Followup    Referring Provider:  Ronen Garcia MD     Chief Complaint   Patient presents with    1 Year Follow Up    Congestive Heart Failure    Atrial Fibrillation      Subjective: Ms Christiano Hayes presents to day for cardiology follow up of HTN, HLD, PAF, and diastolic CHF; no complaints today    History of Present Illness:     Ms. Christiano Hayes is an 80 y.o. female with history of HTN, HLD, PAF on low dose xarelto (CrCl <75SV/IOX), diastolic CHF dx 02/05, CRI, diet controlled DM, s/p hysterectomy, and thyroid disease. Admitted on 4/21/15 with gallstone pancreatitis. She developed afib during admit and was asymptomatic but converted NSR. She returned back to the ER on 05/06/2015 with recurrent acute pancreatitis. She underwent ERCP on 05/11/2015 with cholecystectomy on 05/12/2015. She had PAF during hospitalization and note that she did again NOT feel symptoms with the arrhythmia. Admitted 12/26/19 with c/o SOB and 10 lb weight gain. Diagnosed with acute diastolic CHF and diuresed with IV lasix. Most recent [place] was obtained 12/26/19 negative for ischemia. Most recent ECHO 12/26/19 EF 55-60% (no change from 4/15 study). Most recent EKG 12/26/19 showed SR, 1st degree AV block, non specific ST change. Note acute on CRI with lab 1/17/20 BUN/Cr=57/2.04) and held lasix. She was in the ED on 6/2/2021 with vaginal bleeding. Underwent hysterectomy eventually. Today, 4/12/2022, her weight is up 15 pounds since last visit (cdfhc=928#). She attributes this to increased junk food intake. She states that she is restricting her fluids per CHF guidelines. She states that she is dealing with vision issues and getting injections in eye (? Macular degeneration). She has only been taking her lasix 40mg once weekly. Patient denies current chest pain, sob, palpitations, dizziness or syncope.       Past Medical History:   has a past medical history of CKD (chronic kidney disease) stage 3, GFR 30-59 ml/min ([facility]), Diabetes mellitus ([facility] [address] Hyperlipidemia, Hypertension, and Thyroid disease. Surgical History:   has a past surgical history that includes Dilation and curettage of uterus; Tubal ligation; Bladder surgery; Skin cancer excision;  section; Colonoscopy (2007); ERCP (2015); and Cholecystectomy (05/12/15). Social History:   reports that she has never smoked. She has never used smokeless tobacco. She reports current alcohol use. She reports that she does not use drugs. Family History:  family history includes Cancer in her brother and sister. No significant heart disease in parents    Home Medications:  Prior to Admission medications    Medication Sig Start Date End Date Taking? Authorizing Provider   isosorbide mononitrate (IMDUR) 30 MG extended release tablet Take 30 mg by mouth daily   Yes Historical Provider, MD   NIFEdipine (ADALAT CC) 30 MG extended release tablet Take 30 mg by mouth daily   Yes Historical Provider, MD   ferrous sulfate (IRON 325) 325 (65 Fe) MG tablet Take 325 mg by mouth daily (with breakfast)   Yes Historical Provider, MD   rivaroxaban (XARELTO) 15 MG TABS tablet TAKE ONE TABLET BY MOUTH DAILY WITH BREAKFAST 3/10/22  Yes Priyanka Contreras MD   hydrALAZINE (APRESOLINE) 50 MG tablet 50 mg 3 times daily  21  Yes Historical Provider, MD   furosemide (LASIX) 40 MG tablet Take 1 tablet by mouth daily 21  Yes Artis Burt MD   metoprolol succinate (TOPROL XL) 100 MG extended release tablet TAKE ONE TABLET BY MOUTH DAILY 20  Yes Priyanka Contreras MD   losartan (COZAAR) 100 MG tablet Take 1 tablet by mouth daily 20  Yes Artis Burt MD   pravastatin (PRAVACHOL) 20 MG tablet Take 20 mg by mouth daily. Yes Historical Provider, MD   fenofibrate (TRICOR) 54 MG tablet Take 54 mg by mouth daily.  [facility] pharmacy: Please dispense generic fenofibrate unless prescriber denote   Yes Historical Provider, MD   levothyroxine (SYNTHROID) 75 MCG tablet Take 88 mcg by mouth Daily    Yes Historical Provider, MD   VITAMIN D-3 (COLECALCIFEROL) 400 UNITS TABS Take by mouth daily. Yes Historical Provider, MD   Docusate Calcium (STOOL SOFTENER PO) Take 1 tablet by mouth daily. Yes Historical Provider, MD   famotidine (PEPCID) 20 MG tablet Take 20 mg by mouth daily. Yes Historical Provider, MD   dilTIAZem (CARTIA XT) 240 MG extended release capsule TAKE ONE CAPSULE BY MOUTH DAILY  Patient not taking: Reported on 4/14/2022 2/4/21   Zarina Smith MD      Allergies:  Aspirin     Review of Systems:   · Constitutional: there has been no unanticipated weight loss. There's been no change in energy level, sleep pattern, or activity level. · Eyes: No visual changes or diplopia. No scleral icterus. · ENT: No Headaches, hearing loss or vertigo. No mouth sores or sore throat. · Cardiovascular: Reviewed in HPI  · Respiratory: No cough or wheezing, no sputum production. No hematemesis. · Gastrointestinal: No abdominal pain, appetite loss, blood in stools. No change in bowel or bladder habits. · Genitourinary: No dysuria, trouble voiding, or hematuria. · Musculoskeletal:  No gait disturbance, weakness or joint complaints. · Integumentary: No rash or pruritis. · Neurological: No headache, diplopia, change in muscle strength, numbness or tingling. No change in gait, balance, coordination, mood, affect, memory, mentation, behavior. · Psychiatric: No anxiety, no depression. · Endocrine: No malaise, fatigue or temperature intolerance. No excessive thirst, fluid intake, or urination. No tremor. · Hematologic/Lymphatic: No abnormal bruising or bleeding, blood clots or swollen lymph nodes. · Allergic/Immunologic: No nasal congestion or hives.     Physical Examination:      /68   Pulse 87   Ht 5' 2\" (1.575 m)   Wt 175 lb (79.4 kg)   SpO2 97%   BMI 32.01 kg/m²        Constitutional and General Appearance: NAD Respiratory:  · Normal excursion and expansion without use of accessory muscles  · Resp Auscultation: Normal breath sounds without dullness  Cardiovascular:  · The apical impulses not displaced  · Heart tones are crisp and normal, regular rhythm. Soft AMA  · Cervical veins are not engorged  · The carotid upstroke is normal in amplitude and contour without delay or bruit  · Normal S1S2, No S3, no murmur   · Peripheral pulses are symmetrical and full  · There is no clubbing, cyanosis of the extremities. · Trace to 1+ BLE  · Femoral Arteries: 2+ and equal  · Pedal Pulses: 2+ and equal   Abdomen:  · No masses or tenderness  · Liver/Spleen: No Abnormalities Noted  Neurological/Psychiatric:  · Alert and oriented in all spheres  · Moves all extremities well  · Exhibits normal gait balance and coordination  · No abnormalities of mood, affect, memory, mentation, or behavior are noted    Lab Results   Component Value Date    CHOL 146 12/28/2019    CHOL 189 09/26/2017    CHOL 200 02/19/2016     Lab Results   Component Value Date    TRIG 125 12/28/2019    TRIG 179 (H) 09/26/2017    TRIG 123 02/19/2016     Lab Results   Component Value Date    HDL 47 01/27/2020    HDL 57 12/28/2019    HDL 48 (L) 09/26/2017     Lab Results   Component Value Date    LDLCALC 88 01/27/2020    LDLCALC 64 12/28/2019    LDLCALC 105 09/26/2017     Lab Results   Component Value Date    LABVLDL 26 01/27/2020    LABVLDL 25 12/28/2019    LABVLDL 20 04/21/2015     Lab Results   Component Value Date    CHOLHDLRATIO 3.9 09/26/2017       LABS:   4/8/2022 Na 142, K+ 4.8  3/7/2022 Hemoglobin 12.8  3/7/2022 , , HDL 51,   3/7/2022 ALT 14, AST 18      Assessment:      1. Atrial Fibrillation:  Paroxysmal afib with CHADS score=5. Due to CRI I will continue lower dose xarelto  15mg daily for anticoagulation (CrCl <50mL/min) on most recent lab 4/22. Note asymptomatic with AFib. She remains NSR on auscultation today.  Prior saw Dr. Clyde Hanson for nephrology but now sees Dr. Daniel Branch. 2. Hypertension: Adequately controlled and will continue current medical regimen. 3. DM II:  Per PCP    4. Hypothyroidism: Per PCP      5. Edema: Mild edema of LE. See #3 below. 6. Chronic diastolic CHF: Compensated. Diagnosed 12/19. Likely from long-standing HTN. She takes lasix 40mg PRN. She can take extra tablet for weight gain 3-4#, increased swelling, and/or SOB. I have instructed her on fluid and salt restriction also. Plan:  1. Adjusted medication list according to patient's home dosing. 2. We will reach out to Dr Daniel Branch regarding weight gain and lasix dosing. I left him VM to call back with any recs. 3. Watch your diet, sodium, and fluid intake. Use lasix 40mg PRN. 4. Follow up in one year, or sooner if needed. 5. EKG today NSR 72bpm; 1st degree AV block; NST change (no sig change 12/19)    Cost of prescription medications and patient compliance have been reviewed with patient. All questions answered. Yancy Oswald RN, am scribing for and in the presence of Dr. Isaak Flores. 04/14/22 12:38 PM   Nelsy Lazaro RN    I, Dr. Isaak Flores, personally performed the services described in this documentation, as scribed by the above signed scribe in my presence. It is both accurate and complete to my knowledge. I agree with the details independently gathered by the clinical support staff, while the remaining scribed note accurately describes my personal service to the patient. Cost of prescription medications and patient compliance have been reviewed with patient. All questions answered. Thank you for allowing me to participate in the care of this individual.     Severa Both.  Caprice Navarro M.D., [facility] [place]

## 2022-04-14 NOTE — TELEPHONE ENCOUNTER
Mary in [place] needed to clarify instructions on Lasix dosing. They need to know the max pt can take in a day. I advised LORI was going to consult with Dr. Michael Weir on Lasix dosing per OV note from today 04/14. Please let Mary know once LORI speaks with Dr. Michael Weir.

## 2023-04-17 ENCOUNTER — TELEPHONE (OUTPATIENT)
Dept: CARDIOLOGY CLINIC | Age: 88
End: 2023-04-17

## 2023-04-17 NOTE — TELEPHONE ENCOUNTER
Need clarification on [facility] for hydrALAZINE (APRESOLINE) 10 MG tablet,  : 90 tablet Refills: 3          Sig: Take 5 tablets by mouth 3 times daily   That's only 6 days. Need daily limit on furosemide (LASIX) 40 MG tablet    Please advise.

## 2023-04-18 DIAGNOSIS — I48.0 PAF (PAROXYSMAL ATRIAL FIBRILLATION) (HCC): ICD-10-CM

## 2023-04-18 DIAGNOSIS — I50.33 ACUTE ON CHRONIC DIASTOLIC CONGESTIVE HEART FAILURE (HCC): ICD-10-CM

## 2023-04-18 DIAGNOSIS — R00.1 BRADYCARDIA: ICD-10-CM

## 2023-04-18 RX ORDER — FUROSEMIDE 40 MG/1
40 TABLET ORAL PRN
Qty: 90 TABLET | Refills: 3 | Status: SHIPPED | OUTPATIENT
Start: 2023-04-18

## 2023-04-18 NOTE — TELEPHONE ENCOUNTER
Spoke with [facility] and I asked them to cancel hydralazine from our orders. They verified that they have scripts on all the medications except for lasix they just all have holds placed on them for one reason or another he said. New script for lasix being sent.

## 2023-04-18 NOTE — TELEPHONE ENCOUNTER
Clarify dosing. If total dosing regimen is 50mg po TID then please give 50mg tablet and she can take TID.  No need for FIVE 10mg tablets to equal 50mg

## 2023-04-18 NOTE — TELEPHONE ENCOUNTER
Spoke with patient and she stated she is taking hydralazine 100 mg TID and kidney dr is prescribing that medication to her.   She also stated that [facility] called her yesterday and stated they didn't receive any refills for lasix, xarelto, metoprolol, losartan, nifedipine

## 2023-07-19 NOTE — PROGRESS NOTES
[facility]   Cardiac Followup    Referring Provider:  Bernardino Cowden, MD     Chief Complaint   Patient presents with    1 Year Follow Up     No compalints      Subjective: Ms Elmer Biggs presents to [facility] for cardiology follow up of HTN, HLD, PAF, and diastolic CHF; no complaints today    History of Present Illness:     Ms. Elmer Biggs is an 80 y.o. female with history of HTN (white coat HTN also),  HLD, PAF on low dose xarelto (CrCl <42ZG/XFZ), diastolic CHF dx 19/79, CRI, diet controlled DM, s/p hysterectomy,  s/p bilateral cataract surgery, and thyroid disease. Admitted on 4/21/15 with gallstone pancreatitis. She developed asymptomatic afib during admit but converted NSR. She underwent ERCP on 05/11/2015 with cholecystectomy on 05/12/2015. She had PAF during admit and asymptomatic again. Admitted 12/26/19 with c/o SOB and 10 lb weight gain. Diagnosed with acute diastolic CHF and diuresed with IV lasix. [facility] nuc 12/26/19 negative for ischemia. Most recent ECHO 12/26/19 EF 55-60% (no change from 4/15 study). Most recent EKG 4/14/22 NSR 72; IVCD; NST change; inferior infarct (minimal inferior infarct criteria new from 12/26/19). Note acute on CRI with lab 1/17/20 BUN/Cr=57/2.04) and held lasix. Today, 7/20/2023, She reports she is doing well from a cardiac standpoint. She reports home BP averages 130/66 (home machine checked for accuracy). She takes lasix twice a week as needed. She reports she had cataract surgery in both eyes with Dr. Rene Londono earlier this year. Patient is taking all cardiac medications as prescribed and tolerates them well. Patient denies current edema, chest pain, sob, palpitations, dizziness or syncope. Past Medical History:   has a past medical history of CKD (chronic kidney disease) stage 3, GFR 30-59 ml/min ([address]), Diabetes mellitus ([address]), Hyperlipidemia, Hypertension, and Thyroid disease.     Surgical History:   has a past surgical history that includes Dilation and

## 2023-07-20 ENCOUNTER — OFFICE VISIT (OUTPATIENT)
Dept: CARDIOLOGY CLINIC | Age: 88
End: 2023-07-20
Payer: MEDICARE

## 2023-07-20 VITALS
WEIGHT: 178 LBS | HEART RATE: 74 BPM | OXYGEN SATURATION: 95 % | SYSTOLIC BLOOD PRESSURE: 158 MMHG | DIASTOLIC BLOOD PRESSURE: 68 MMHG | BODY MASS INDEX: 32.76 KG/M2 | HEIGHT: 62 IN

## 2023-07-20 DIAGNOSIS — I10 PRIMARY HYPERTENSION: Primary | ICD-10-CM

## 2023-07-20 DIAGNOSIS — I48.0 PAF (PAROXYSMAL ATRIAL FIBRILLATION) (HCC): ICD-10-CM

## 2023-07-20 DIAGNOSIS — I50.33 ACUTE ON CHRONIC DIASTOLIC CONGESTIVE HEART FAILURE (HCC): ICD-10-CM

## 2023-07-20 PROCEDURE — 1123F ACP DISCUSS/DSCN MKR DOCD: CPT | Performed by: INTERNAL MEDICINE

## 2023-07-20 PROCEDURE — 93000 ELECTROCARDIOGRAM COMPLETE: CPT | Performed by: INTERNAL MEDICINE

## 2023-07-20 PROCEDURE — 99214 OFFICE O/P EST MOD 30 MIN: CPT | Performed by: INTERNAL MEDICINE

## 2023-07-20 NOTE — PATIENT INSTRUCTIONS
Plan:  1. EKG today  2. Continue current cardiac medications without changes  3 . No further cardiac testing  4. Follow up with your PCP for yearly fasting lipids. You need these checked this year at your next visit  5.  Follow up with me in 1 year

## 2023-11-26 DIAGNOSIS — I10 PRIMARY HYPERTENSION: Primary | ICD-10-CM

## 2023-11-27 RX ORDER — LOSARTAN POTASSIUM 100 MG/1
100 TABLET ORAL DAILY
Qty: 90 TABLET | Refills: 2 | Status: SHIPPED | OUTPATIENT
Start: 2023-11-27

## 2023-11-27 NOTE — TELEPHONE ENCOUNTER
LOV 7/20/23 [facility]  Renal Panel 4/20/23 in [address] [facility].

## 2024-05-20 ENCOUNTER — TELEPHONE (OUTPATIENT)
Dept: CARDIOLOGY CLINIC | Age: 89
End: 2024-05-20

## 2024-05-20 RX ORDER — METOPROLOL SUCCINATE 100 MG/1
100 TABLET, EXTENDED RELEASE ORAL DAILY
Qty: 90 TABLET | Refills: 0 | Status: SHIPPED | OUTPATIENT
Start: 2024-05-20

## 2024-05-20 NOTE — TELEPHONE ENCOUNTER
Pt called and stated she is out of metoprolol after today and needs refill sent to [facility] on [facility] due to it taking to long for [facility] to receive and fill.   ANAND SANDOVAL 07/23/24

## 2024-07-16 NOTE — PROGRESS NOTES
dizziness/fatigue.     Follow up in 1 year    Scribe's attestation:  This note was scribed in the presence of Dr. Lefty Tobar MD. By Autumn Pena RN      I, Dr. Lefty Tobar, personally performed the services described in this documentation, as scribed by the above signed scribe in my presence. It is both accurate and complete to my knowledge. I agree with the details independently gathered by the clinical support staff, while the remaining scribed note accurately describes my personal service to the patient.    Cost of prescription medications and patient compliance have been reviewed with patient. All questions answered.     Thank you for allowing me to participate in the care of this individual.     Lefty Tobar M.D., FACC

## 2024-07-23 ENCOUNTER — OFFICE VISIT (OUTPATIENT)
Dept: CARDIOLOGY CLINIC | Age: 89
End: 2024-07-23
Payer: MEDICARE

## 2024-07-23 VITALS
OXYGEN SATURATION: 97 % | DIASTOLIC BLOOD PRESSURE: 80 MMHG | SYSTOLIC BLOOD PRESSURE: 160 MMHG | HEART RATE: 82 BPM | BODY MASS INDEX: 30.73 KG/M2 | HEIGHT: 62 IN | WEIGHT: 167 LBS

## 2024-07-23 DIAGNOSIS — R00.1 BRADYCARDIA: ICD-10-CM

## 2024-07-23 DIAGNOSIS — I48.0 PAF (PAROXYSMAL ATRIAL FIBRILLATION) (HCC): Primary | ICD-10-CM

## 2024-07-23 DIAGNOSIS — I50.32 CHRONIC DIASTOLIC HEART FAILURE (HCC): ICD-10-CM

## 2024-07-23 DIAGNOSIS — I10 PRIMARY HYPERTENSION: ICD-10-CM

## 2024-07-23 DIAGNOSIS — E78.2 MIXED HYPERLIPIDEMIA: ICD-10-CM

## 2024-07-23 DIAGNOSIS — I50.33 ACUTE ON CHRONIC DIASTOLIC CONGESTIVE HEART FAILURE (HCC): ICD-10-CM

## 2024-07-23 PROBLEM — E78.5 HYPERLIPIDEMIA: Status: ACTIVE | Noted: 2020-08-07

## 2024-07-23 PROCEDURE — 99214 OFFICE O/P EST MOD 30 MIN: CPT | Performed by: INTERNAL MEDICINE

## 2024-07-23 PROCEDURE — 93000 ELECTROCARDIOGRAM COMPLETE: CPT | Performed by: INTERNAL MEDICINE

## 2024-07-23 PROCEDURE — 1123F ACP DISCUSS/DSCN MKR DOCD: CPT | Performed by: INTERNAL MEDICINE

## 2024-07-23 RX ORDER — LOSARTAN POTASSIUM 100 MG/1
100 TABLET ORAL DAILY
Qty: 90 TABLET | Refills: 2 | Status: SHIPPED | OUTPATIENT
Start: 2024-07-23

## 2024-07-23 RX ORDER — PRAVASTATIN SODIUM 20 MG
20 TABLET ORAL DAILY
Qty: 90 TABLET | Refills: 3 | Status: SHIPPED | OUTPATIENT
Start: 2024-07-23

## 2024-07-23 RX ORDER — FUROSEMIDE 40 MG/1
40 TABLET ORAL PRN
Qty: 90 TABLET | Refills: 3 | Status: SHIPPED | OUTPATIENT
Start: 2024-07-23

## 2024-07-23 RX ORDER — NIFEDIPINE 30 MG
30 TABLET, EXTENDED RELEASE ORAL 2 TIMES DAILY
Qty: 180 TABLET | Refills: 3 | Status: SHIPPED | OUTPATIENT
Start: 2024-07-23

## 2024-07-23 RX ORDER — METOPROLOL SUCCINATE 100 MG/1
100 TABLET, EXTENDED RELEASE ORAL DAILY
Qty: 90 TABLET | Refills: 3 | Status: SHIPPED | OUTPATIENT
Start: 2024-07-23

## 2024-07-23 RX ORDER — ISOSORBIDE MONONITRATE 30 MG/1
30 TABLET, EXTENDED RELEASE ORAL DAILY
Qty: 90 TABLET | Refills: 3 | Status: SHIPPED | OUTPATIENT
Start: 2024-07-23

## 2024-07-23 RX ORDER — HYDRALAZINE HYDROCHLORIDE 100 MG/1
100 TABLET, FILM COATED ORAL 3 TIMES DAILY
Qty: 275 TABLET | Refills: 3 | Status: SHIPPED | OUTPATIENT
Start: 2024-07-23

## 2024-07-23 NOTE — PATIENT INSTRUCTIONS
Labs reviewed in epic and discussed with patient.  Medications reviewed in office. Medications refilled as warranted    Monitor your blood pressure at home twice a day, morning and night. Recommend a monitor for your bicep. Goal 130/80 or less. Also call if BP is low (<100 systolic) or if you are having dizziness/fatigue.

## 2025-03-21 LAB
ALBUMIN: 4.1 G/DL
ALP BLD-CCNC: 56 U/L
ALT SERPL-CCNC: 10 U/L
ANION GAP SERPL CALCULATED.3IONS-SCNC: 14 MMOL/L
AST SERPL-CCNC: 15 U/L
BASOPHILS ABSOLUTE: 0.1 /ΜL
BASOPHILS RELATIVE PERCENT: 0.8 %
BILIRUB SERPL-MCNC: 0.3 MG/DL (ref 0.1–1.4)
BUN BLDV-MCNC: 38 MG/DL
CALCIUM SERPL-MCNC: 9.4 MG/DL
CHLORIDE BLD-SCNC: 106 MMOL/L
CHOLESTEROL, TOTAL: 235 MG/DL
CHOLESTEROL/HDL RATIO: NORMAL
CO2: 23 MMOL/L
CREAT SERPL-MCNC: 1 MG/DL
EOSINOPHILS ABSOLUTE: 0.1 /ΜL
EOSINOPHILS RELATIVE PERCENT: 1.9 %
ESTIMATED AVERAGE GLUCOSE: 163
GFR, ESTIMATED: 53
GLUCOSE BLD-MCNC: 238 MG/DL
HBA1C MFR BLD: 7.3 %
HCT VFR BLD CALC: 40.1 % (ref 36–46)
HDLC SERPL-MCNC: 59 MG/DL (ref 35–70)
HEMOGLOBIN: 12.5 G/DL (ref 12–16)
LDL CHOLESTEROL: 146
LYMPHOCYTES ABSOLUTE: 1.3 /ΜL
LYMPHOCYTES RELATIVE PERCENT: 20.6 %
MCH RBC QN AUTO: 27.8 PG
MCHC RBC AUTO-ENTMCNC: 31.2 G/DL
MCV RBC AUTO: 89.1 FL
MONOCYTES ABSOLUTE: 0.3 /ΜL
MONOCYTES RELATIVE PERCENT: 5 %
NEUTROPHILS ABSOLUTE: 4.6 /ΜL
NEUTROPHILS RELATIVE PERCENT: 71.4 %
NONHDLC SERPL-MCNC: 176 MG/DL
PLATELET # BLD: 183 K/ΜL
PMV BLD AUTO: 10.2 FL
POTASSIUM SERPL-SCNC: 4.2 MMOL/L
RBC # BLD: 4.5 10^6/ΜL
SODIUM BLD-SCNC: 143 MMOL/L
TOTAL PROTEIN: 6.4 G/DL (ref 6.4–8.2)
TRIGL SERPL-MCNC: 168 MG/DL
VLDLC SERPL CALC-MCNC: NORMAL MG/DL
WBC # BLD: 6.4 10^3/ML

## 2025-03-27 DIAGNOSIS — I10 PRIMARY HYPERTENSION: ICD-10-CM

## 2025-03-27 RX ORDER — LOSARTAN POTASSIUM 100 MG/1
100 TABLET ORAL DAILY
Qty: 90 TABLET | Refills: 2 | Status: SHIPPED | OUTPATIENT
Start: 2025-03-27

## 2025-03-27 NOTE — TELEPHONE ENCOUNTER
Patient last seen on 7/23/24. Advised to follow up 1 year. Next appointment 9/2/25.     Lab Results   Component Value Date     06/02/2021    K 3.8 06/02/2021     06/02/2021    CO2 23 06/02/2021    BUN 32 (H) 06/02/2021    CREATININE 1.0 06/02/2021    GLUCOSE 180 (H) 06/02/2021    CALCIUM 9.9 06/02/2021    BILITOT 0.3 06/02/2021    ALKPHOS 40 06/02/2021    AST 14 (L) 06/02/2021    ALT 9 (L) 06/02/2021    LABGLOM 53 (A) 06/02/2021    GFRAA >60 06/02/2021    AGRATIO 1.6 06/02/2021    GLOB 2.5 06/02/2021

## 2025-04-24 LAB
BUN BLDV-MCNC: 26 MG/DL
CALCIUM SERPL-MCNC: 9.4 MG/DL
CHLORIDE BLD-SCNC: 103 MMOL/L
CO2: 103 MMOL/L
CREAT SERPL-MCNC: 1.1 MG/DL
EGFR: 47
GLUCOSE BLD-MCNC: 210 MG/DL
POTASSIUM SERPL-SCNC: 3.8 MMOL/L
SODIUM BLD-SCNC: 140 MMOL/L

## 2025-05-30 DIAGNOSIS — Z79.899 MEDICATION MANAGEMENT: Primary | ICD-10-CM

## 2025-05-30 DIAGNOSIS — I50.33 ACUTE ON CHRONIC DIASTOLIC CONGESTIVE HEART FAILURE (HCC): ICD-10-CM

## 2025-05-30 DIAGNOSIS — R00.1 BRADYCARDIA: ICD-10-CM

## 2025-05-30 DIAGNOSIS — I48.0 PAF (PAROXYSMAL ATRIAL FIBRILLATION) (HCC): ICD-10-CM

## 2025-05-30 RX ORDER — FUROSEMIDE 40 MG/1
40 TABLET ORAL PRN
Qty: 90 TABLET | Refills: 3 | Status: SHIPPED | OUTPATIENT
Start: 2025-05-30

## 2025-05-30 NOTE — TELEPHONE ENCOUNTER
Patient last seen on 7/23/24. Advised to follow up 1 year. Next appointment 9/2/25.       Patient needs updated labs.

## 2025-05-30 NOTE — TELEPHONE ENCOUNTER
Aurea Jarvis  11/29/1934  [phone]    Medication Refill    Medication needing refilled: Lasix    Dosage of the medication: 40 mg    How are you taking this medication (QD, BID, TID, QID, PRN): Take 1 tablet by mouth as needed (for weight gain and edema)     30 or 90 day supply called in: 90    Which Pharmacy are we sending the medication to?: [facility]    Last OV: 7.3.24     Next OV: 9.2.25

## 2025-06-12 DIAGNOSIS — R00.1 BRADYCARDIA: ICD-10-CM

## 2025-06-12 DIAGNOSIS — I48.0 PAF (PAROXYSMAL ATRIAL FIBRILLATION) (HCC): ICD-10-CM

## 2025-06-12 DIAGNOSIS — I50.33 ACUTE ON CHRONIC DIASTOLIC CONGESTIVE HEART FAILURE (HCC): ICD-10-CM

## 2025-06-12 RX ORDER — PRAVASTATIN SODIUM 20 MG
20 TABLET ORAL DAILY
Qty: 90 TABLET | Refills: 3 | Status: SHIPPED | OUTPATIENT
Start: 2025-06-12

## 2025-06-12 NOTE — TELEPHONE ENCOUNTER
Patient last seen on 7/23/24. Advised to follow up 1 year. Next appointment 9/2/25.       Lab Results   Component Value Date    WBC 6.4 03/21/2025    HGB 12.5 03/21/2025    HCT 40.1 03/21/2025    MCV 89.1 03/21/2025     03/21/2025     Lab Results   Component Value Date    CHOL 235 03/21/2025    TRIG 168 03/21/2025    HDL 59 03/21/2025     03/21/2025    VLDL 26 01/27/2020    CHOLHDLRATIO 3.9 09/26/2017

## 2025-09-02 ENCOUNTER — OFFICE VISIT (OUTPATIENT)
Dept: CARDIOLOGY CLINIC | Age: 89
End: 2025-09-02
Payer: MEDICARE

## 2025-09-02 VITALS
BODY MASS INDEX: 30.27 KG/M2 | WEIGHT: 164.5 LBS | OXYGEN SATURATION: 97 % | DIASTOLIC BLOOD PRESSURE: 82 MMHG | HEART RATE: 75 BPM | HEIGHT: 62 IN | SYSTOLIC BLOOD PRESSURE: 150 MMHG

## 2025-09-02 DIAGNOSIS — E78.5 HYPERLIPIDEMIA, UNSPECIFIED HYPERLIPIDEMIA TYPE: ICD-10-CM

## 2025-09-02 DIAGNOSIS — E03.9 HYPOTHYROIDISM, UNSPECIFIED TYPE: Primary | ICD-10-CM

## 2025-09-02 DIAGNOSIS — Z79.899 MEDICATION MANAGEMENT: ICD-10-CM

## 2025-09-02 PROCEDURE — 99214 OFFICE O/P EST MOD 30 MIN: CPT | Performed by: INTERNAL MEDICINE

## 2025-09-02 PROCEDURE — 1123F ACP DISCUSS/DSCN MKR DOCD: CPT | Performed by: INTERNAL MEDICINE

## 2025-09-02 PROCEDURE — 1159F MED LIST DOCD IN RCRD: CPT | Performed by: INTERNAL MEDICINE

## 2025-09-02 RX ORDER — LEVOTHYROXINE SODIUM 88 UG/1
88 TABLET ORAL DAILY
Qty: 30 TABLET | Refills: 0
Start: 2025-09-02

## 2025-09-02 RX ORDER — PRAVASTATIN SODIUM 40 MG
40 TABLET ORAL DAILY
Qty: 90 TABLET | Refills: 1 | Status: SHIPPED | OUTPATIENT
Start: 2025-09-02

## 2025-09-03 ENCOUNTER — TELEPHONE (OUTPATIENT)
Dept: CARDIOLOGY CLINIC | Age: 89
End: 2025-09-03

## 2025-09-03 DIAGNOSIS — E78.5 HYPERLIPIDEMIA, UNSPECIFIED HYPERLIPIDEMIA TYPE: ICD-10-CM
